# Patient Record
Sex: MALE | Race: WHITE | NOT HISPANIC OR LATINO | Employment: OTHER | URBAN - METROPOLITAN AREA
[De-identification: names, ages, dates, MRNs, and addresses within clinical notes are randomized per-mention and may not be internally consistent; named-entity substitution may affect disease eponyms.]

---

## 2017-01-27 ENCOUNTER — HOSPITAL ENCOUNTER (OUTPATIENT)
Dept: PSYCHIATRY | Facility: HOSPITAL | Age: 52
Discharge: HOME | End: 2017-01-27
Attending: PSYCHIATRY & NEUROLOGY
Payer: COMMERCIAL

## 2017-01-27 VITALS
HEART RATE: 74 BPM | BODY MASS INDEX: 19.12 KG/M2 | TEMPERATURE: 98 F | HEIGHT: 72 IN | DIASTOLIC BLOOD PRESSURE: 83 MMHG | WEIGHT: 141.19 LBS | SYSTOLIC BLOOD PRESSURE: 135 MMHG

## 2017-01-27 DIAGNOSIS — F10.20 ALCOHOL USE DISORDER, SEVERE, DEPENDENCE: Primary | ICD-10-CM

## 2017-01-27 DIAGNOSIS — F10.10 ALCOHOL ABUSE: ICD-10-CM

## 2017-01-27 LAB
AMPHET+METHAMPHET UR QL: NEGATIVE
BARBITURATES UR QL SCN>200 NG/ML: NEGATIVE
BENZODIAZ UR QL SCN>200 NG/ML: ABNORMAL
BREATH ALCOHOL: 0.06
BREATH ALCOHOL: 0.12
BZE UR QL SCN: NEGATIVE
CANNABINOIDS UR QL SCN: NEGATIVE
CREAT UR-MCNC: 313 MG/DL
ETHANOL UR-MCNC: 109 MG/DL
METHADONE UR QL SCN>300 NG/ML: NEGATIVE
OPIATES UR QL SCN: NEGATIVE
PCP UR QL SCN>25 NG/ML: NEGATIVE
TOXICOLOGY INFORMATION: ABNORMAL

## 2017-01-27 PROCEDURE — 99222 1ST HOSP IP/OBS MODERATE 55: CPT | Mod: ,,, | Performed by: PSYCHIATRY & NEUROLOGY

## 2017-01-27 PROCEDURE — 80307 DRUG TEST PRSMV CHEM ANLYZR: CPT

## 2017-01-27 RX ORDER — DIAZEPAM 5 MG/1
5 TABLET ORAL
Qty: 14 TABLET | Refills: 0 | Status: SHIPPED | OUTPATIENT
Start: 2017-01-27 | End: 2017-01-30
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
## 2017-01-30 ENCOUNTER — HOSPITAL ENCOUNTER (OUTPATIENT)
Dept: PSYCHIATRY | Facility: HOSPITAL | Age: 52
Discharge: HOME | End: 2017-01-30
Attending: PSYCHIATRY & NEUROLOGY
Payer: COMMERCIAL

## 2017-01-30 VITALS — HEART RATE: 97 BPM | DIASTOLIC BLOOD PRESSURE: 82 MMHG | RESPIRATION RATE: 18 BRPM | SYSTOLIC BLOOD PRESSURE: 147 MMHG

## 2017-01-30 DIAGNOSIS — F10.10 ALCOHOL ABUSE: ICD-10-CM

## 2017-01-30 DIAGNOSIS — F10.20 ALCOHOL USE DISORDER, SEVERE, DEPENDENCE: Primary | ICD-10-CM

## 2017-01-30 LAB
AMPHET+METHAMPHET UR QL: NEGATIVE
BARBITURATES UR QL SCN>200 NG/ML: NEGATIVE
BENZODIAZ UR QL SCN>200 NG/ML: ABNORMAL
BREATH ALCOHOL: 0
BZE UR QL SCN: NEGATIVE
CANNABINOIDS UR QL SCN: NEGATIVE
CREAT UR-MCNC: >450 MG/DL
ETHANOL UR-MCNC: <10 MG/DL
METHADONE UR QL SCN>300 NG/ML: NEGATIVE
OPIATES UR QL SCN: NEGATIVE
PCP UR QL SCN>25 NG/ML: NEGATIVE
TOXICOLOGY INFORMATION: ABNORMAL

## 2017-01-30 PROCEDURE — 80307 DRUG TEST PRSMV CHEM ANLYZR: CPT

## 2017-01-30 PROCEDURE — 90853 GROUP PSYCHOTHERAPY: CPT | Mod: 59,,, | Performed by: PSYCHOLOGIST

## 2017-01-30 PROCEDURE — 90853 GROUP PSYCHOTHERAPY: CPT | Mod: ,,, | Performed by: PSYCHOLOGIST

## 2017-01-30 PROCEDURE — 90853 GROUP PSYCHOTHERAPY: CPT

## 2017-01-30 PROCEDURE — 90853 GROUP PSYCHOTHERAPY: CPT | Performed by: SOCIAL WORKER

## 2017-01-30 PROCEDURE — 99232 SBSQ HOSP IP/OBS MODERATE 35: CPT | Mod: ,,, | Performed by: PSYCHIATRY & NEUROLOGY

## 2017-01-30 RX ORDER — DIAZEPAM 5 MG/1
TABLET ORAL
Qty: 6 TABLET | Refills: 0 | Status: SHIPPED | OUTPATIENT
Start: 2017-01-30 | End: 2017-01-30 | Stop reason: CLARIF

## 2017-01-30 RX ORDER — DIAZEPAM 5 MG/1
TABLET ORAL
Qty: 8 TABLET | Refills: 0 | Status: SHIPPED | OUTPATIENT
Start: 2017-01-30 | End: 2017-02-07

## 2017-01-30 NOTE — PLAN OF CARE
01/30/17 1000   Activity/Group Therapy Checklist   Group Addiction Education  (Peer presented life story; feedback given.)   Attendance Attended   Follows Direction Followed directions   Group Interactions/Observations Interacted appropriately   Affect/Mood Range Normal range   Affect/Mood Display Appropriate   Goal Progression Progressing

## 2017-01-30 NOTE — PROGRESS NOTES
Group Psychotherapy (PhD/LCSW)    Site: [facility]    Clinical status of patient: Intensive Outpatient Program (IOP)    Date: 1/30/2017    Group Focus: Communication Skills     Length of service: 83038 - 45-50 minutes    Number of patients in attendance: 14    Referred by: Addictive Behavior Unit Treatment Team    Target symptoms: Alcohol Abuse    Patient's response to treatment: Active Listening and Self-disclosure    Progress toward goals: Progressing adequately    Interval History: Discussed the way differences in gender, fx of origin experiences, and ethnicity can hamper the communication process when they are not taken into account. Illustrated how the use of I-messages and Reflective Listening skills can help overcome these difficulties and enhance the communication process.    Diagnosis: alcohol use disorder, severe, dependence    Plan: Continue treatment on ABU

## 2017-01-30 NOTE — PROGRESS NOTES
Group Psychotherapy (PhD/LCSW)    Site: [facility]    Clinical status of patient: Intensive Outpatient Program (IOP)    Date: 1/30/2017    Group Focus: Psychodynamic Group Psychotherapy    Length of service: 35067 - 45-50 minutes    Number of patients in attendance: 10    Referred by: Addictive Behavior Unit Treatment Team    Target symptoms: Alcohol Abuse    Patient's response to treatment: Active Listening and Self-disclosure    Progress toward goals: Progressing adequately    Interval History: Shared his story with the group.    Diagnosis: alcohol use disorder, severe, dependence    Plan: Continue treatment on ABU

## 2017-01-30 NOTE — PROGRESS NOTES
PSYCHIATRY  ABU Partial Hospitalization  PROGRESS NOTE    Patient Name: Eros Vital  2017  10:08 AM  Start Date: 2017  : 1965    Status: Intensive Outpatient Program (IOP)    SUBJECTIVE:    Mr. Vital said overall this weekend went well. He mostly stayed in the bedroom and took it easy. He took his Valium taper as prescribed by his report. He had dreams of people drinking liquor but in the dreams he wasn't drinking. Had no cravings for alcohol. Eating is not much better. Has been taking the thiamine as well. He felt physically better yesterday morning but then today started to have tremors and weakness in the legs (after a period of feeling well). He was able to walk well over the weekend as well. Took one 5 mg Valium pill this morning. No AA meetings this weekend due to withdrawal from alcohol (patient attempting to maintain safety, which is appropriate).     Medication Side Effects: denied  Cravings: denied  No other acute psychiatric issues reported at this time.    Scheduled Meds:   Current Outpatient Prescriptions on File Prior to Encounter   Medication Sig Dispense Refill    diazePAM (VALIUM) 5 MG tablet Take 1 tablet (5 mg total) by mouth taper from 4 doses each day to 1 dose and stop. one additional dose daily  after first day prn anxiety 14 tablet 0     No current facility-administered medications on file prior to encounter.          ALLERGIES:  Review of patient's allergies indicates:  No Known Allergies      Psychiatric Review Of Systems - Is patient experiencing or having changes in:  sleep: good  appetite: no changes  weight: no changes  energy/anergy: no changes  interest/pleasure/anhedonia: no changes  somatic symptoms: persistent leg weakness  libido: no changes  anxiety/panic: no changes  guilty/hopelessness: no changes  concentration: no changes  S.I.B.s/risky behavior: no changes  Irritability: no changes  Racing thoughts: no changes  Impulsive behaviors: no changes  Paranoia: no  "changes  AVH: no changes    Medical ROS:  See Dr. Waller/Rodney's Admission Note of date 1/27/2017 for ROS.     No changes.    OBJECTIVE:    Vital Signs (Most Recent)       BP 98/65     Mental Status Exam:    Appearance: older white male wearing casual clothes in NAD; poor hygiene   Behavior: calm, cooperative; obviously tremulous  Speech: normal rate, tone, and volume  Mood: "good"  Affect: full  Thought Process: linear  Thought Perceptions: denied AVH  Thought Content: denied SI, HI; no delusions apparent  Sensorium: awake, alert  Attention/Concentration: intact to conversation  Orientation: person, place, time, and situation  Memory: intact (recent, remote)  Abstraction: intact   Insight: fair  Judgment: fair    Laboratory:  No results found for this or any previous visit (from the past 48 hour(s)).      ASSESSMENT/PLAN:    Alcohol Use Disorder, severe  PTSD    Status:  Continue treatment on ABU    Plan:     1. Continue ABU protocol.  2. Breathalyzer and Urine toxicology daily.  3. VS daily x 3 days.  4. CBC, CMP  5. Patient counseled on abstinence from all substances (except benzodiazepines for now).    Patient's Intervention Response: accepting    Medications:      -continue thiamine  -will consider Naltrexone once patient has cleared alcohol withdrawal  -labs pending  -vitals, physical exam concerning for persistent alcohol withdrawal  -restarted benzodiazepine taper (had one 5 mg this AM; prescribed 5 mg QID today, tomorrow 5 mg TID, then once the following two days)    Additional Comments: Breathalyzer negative today. UDS last week + for benzodiazepines, as expected.     Patient seen and discussed with Dr. Stevens.    David Waller IV, MD  PGY-2 Psychiatry, [facility]  01/30/2017 10:21 AM        Attending Attestation:    I have independently evaluated the patient and discussed the case with the resident. I have reviewed this note and agree with its contents, as well as the assessment and plan. "     Kaity Stevens MD

## 2017-01-31 ENCOUNTER — HOSPITAL ENCOUNTER (OUTPATIENT)
Dept: PSYCHIATRY | Facility: HOSPITAL | Age: 52
Discharge: HOME | End: 2017-01-31
Attending: PSYCHIATRY & NEUROLOGY
Payer: COMMERCIAL

## 2017-01-31 VITALS — HEART RATE: 97 BPM | RESPIRATION RATE: 18 BRPM | SYSTOLIC BLOOD PRESSURE: 140 MMHG | DIASTOLIC BLOOD PRESSURE: 74 MMHG

## 2017-01-31 DIAGNOSIS — F10.20 ALCOHOL USE DISORDER, SEVERE, DEPENDENCE: Primary | ICD-10-CM

## 2017-01-31 DIAGNOSIS — F10.10 ALCOHOL ABUSE: ICD-10-CM

## 2017-01-31 LAB
AMPHET+METHAMPHET UR QL: NEGATIVE
BARBITURATES UR QL SCN>200 NG/ML: NEGATIVE
BENZODIAZ UR QL SCN>200 NG/ML: NORMAL
BREATH ALCOHOL: 0
BZE UR QL SCN: NEGATIVE
CANNABINOIDS UR QL SCN: NEGATIVE
CREAT UR-MCNC: 266 MG/DL
ETHANOL UR-MCNC: <10 MG/DL
METHADONE UR QL SCN>300 NG/ML: NEGATIVE
OPIATES UR QL SCN: NEGATIVE
PCP UR QL SCN>25 NG/ML: NEGATIVE
TOXICOLOGY INFORMATION: NORMAL

## 2017-01-31 PROCEDURE — 90853 GROUP PSYCHOTHERAPY: CPT | Mod: ,,, | Performed by: PSYCHOLOGIST

## 2017-01-31 PROCEDURE — 90853 GROUP PSYCHOTHERAPY: CPT | Mod: 59,,, | Performed by: PSYCHOLOGIST

## 2017-01-31 PROCEDURE — 90853 GROUP PSYCHOTHERAPY: CPT

## 2017-01-31 PROCEDURE — 80307 DRUG TEST PRSMV CHEM ANLYZR: CPT

## 2017-01-31 PROCEDURE — 90853 GROUP PSYCHOTHERAPY: CPT | Performed by: SOCIAL WORKER

## 2017-01-31 NOTE — PLAN OF CARE
01/31/17 1000   Activity/Group Therapy Checklist   Group Goals/Reflection  (incomplete prompts)   Attendance Attended   Follows Direction Followed directions   Group Interactions/Observations Interacted appropriately   Affect/Mood Range Normal range   Affect/Mood Display Appropriate   Goal Progression Progressing

## 2017-01-31 NOTE — PROGRESS NOTES
Group Psychotherapy (PhD/LCSW)    Site: [facility]    Clinical status of patient: Intensive Outpatient Program (IOP)    Date: 1/31/2017    Group Focus: Disease Model of Addiction    Length of service: 73773 - 45-50 minutes    Number of patients in attendance: 9    Referred by: Addictive Behavior Unit Treatment Team    Target symptoms: Alcohol Abuse    Patient's response to treatment: Active Listening and Self-disclosure    Progress toward goals: Progressing adequately    Interval History: Discussed the basic neuropsychological concepts of the Disease Model of Addiction and the way they relate to the phenomena of addiction (powerlessnes; euphoric recall; cravings; relapse; etc). Noted the way the disease model comports with the practice of 12-step recovery. Explained the importance of understanding the disease model for sustaining long-term sobriety.     Diagnosis: alcohol use disorder, severe, dependence    Plan: Continue treatment on ABU

## 2017-01-31 NOTE — PLAN OF CARE
01/30/17 1400   Activity/Group Therapy Checklist   Group Goals/Reflection  (incomplete prompts)   Attendance Attended   Follows Direction Followed directions   Group Interactions/Observations Interacted appropriately  (in active withdrawal)   Affect/Mood Range Normal range   Affect/Mood Display Appropriate   Goal Progression Progressing

## 2017-01-31 NOTE — PLAN OF CARE
01/31/17 1400   Activity/Group Therapy Checklist   Group Relapse Prevention  (Negative spiritual factors: Fear, self-pity, resentment & dishonesty)   Attendance Attended   Follows Direction Followed directions   Group Interactions/Observations Interacted appropriately   Affect/Mood Range Normal range   Affect/Mood Display Appropriate   Goal Progression Progressing

## 2017-01-31 NOTE — PROGRESS NOTES
Group Psychotherapy (PhD/LCSW)    Site: [facility]    Clinical status of patient: Intensive Outpatient Program (IOP)    Date: 1/31/2017    Group Focus: Psychodynamic Group Psychotherapy    Length of service: 07108 - 45-50 minutes    Number of patients in attendance: 10    Referred by: Addictive Behavior Unit Treatment Team    Target symptoms: Alcohol Abuse    Patient's response to treatment: Active Listening and Self-disclosure    Progress toward goals: Progressing adequately    Interval History: Shared his story with new group member.    Diagnosis: alcohol use disorder, severe, dependence    Plan: Continue treatment on ABU

## 2017-01-31 NOTE — PROGRESS NOTES
PSYCHIATRY  ABU Partial Hospitalization  PROGRESS NOTE    Patient Name: Eros Vital  2017  10:08 AM  Start Date: 2017  : 1965    Status: Intensive Outpatient Program (IOP)    SUBJECTIVE:    Mr. Vital said he is doing well this morning. Has enough Valium for his full taper. He said his wife is being productive at home and has been supportive of him. He is mostly laying around and relaxing. He is trying to improve his nutrition, eating mostly takeout with lots of calories and protein. His tremors are much improved. He is able to walk with a cane today. He said he still feels ill, but much improved.     Medication Side Effects: denied  Cravings: denied  No other acute psychiatric issues reported at this time.    Scheduled Meds:   Current Outpatient Prescriptions on File Prior to Encounter   Medication Sig Dispense Refill    diazePAM (VALIUM) 5 MG tablet Take four pills on day one. Take three pills on day two. On the last two days, take one pill daily. 8 tablet 0     No current facility-administered medications on file prior to encounter.          ALLERGIES:  Review of patient's allergies indicates:  No Known Allergies      Psychiatric Review Of Systems - Is patient experiencing or having changes in:  sleep: no changes  appetite: no changes  weight: no changes  energy/anergy: no changes  interest/pleasure/anhedonia: no changes  somatic symptoms: persistent leg weakness  libido: no changes  anxiety/panic: no changes  guilty/hopelessness: no changes  concentration: no changes  S.I.B.s/risky behavior: no changes  Irritability: no changes  Racing thoughts: no changes  Impulsive behaviors: no changes  Paranoia: no changes  AVH: no changes    Medical ROS:  See Dr. Waller/Rodney's Admission Note of date 2017 for ROS.     OBJECTIVE:    Vital Signs (Most Recent)    (not documented in Epic)      /85    Mental Status Exam:    Appearance: older white male wearing casual clothes in NAD; hygiene  "improved, with improving tremors  Behavior: calm, cooperative  Speech: normal rate, tone, and volume  Mood: "good"  Affect: full  Thought Process: linear  Thought Perceptions: denied AVH  Thought Content: denied SI, HI; no delusions apparent  Sensorium: awake, alert  Attention/Concentration: intact to conversation  Orientation: person, place, time, and situation  Memory: intact (recent, remote)  Abstraction: intact   Insight: fair  Judgment: fair    Laboratory:  Recent Results (from the past 48 hour(s))   Toxicology screen, urine    Collection Time: 01/30/17 12:43 PM   Result Value Ref Range    Alcohol, Urine <10 <10 mg/dL    Benzodiazepines Presumptive Positive     Methadone metabolites Negative     Cocaine (Metab.) Negative     Opiate Scrn, Ur Negative     Barbiturate Screen, Ur Negative     Amphetamine Screen, Ur Negative     THC Negative     Phencyclidine Negative     Creatinine, Random Ur >450.0 (H) 23.0 - 375.0 mg/dL    Toxicology Information SEE COMMENT    POCT BREATH ALCOHOL TEST    Collection Time: 01/30/17 12:44 PM   Result Value Ref Range    Breath Alcohol 0.000          ASSESSMENT/PLAN:    Alcohol Use Disorder, severe  PTSD    Status:  Continue treatment on ABU    Plan:     1. Continue ABU protocol.  2. Breathalyzer and Urine toxicology daily.  3. VS daily x 3 days.  4. CBC, CMP, B-12, TSH, Folate (will get today)  5. Patient counseled on abstinence from all substances (except benzodiazepines for now).    Patient's Intervention Response: accepting    Medications:      -continue thiamine   -will consider Naltrexone once patient has cleared alcohol withdrawal  -labs pending  -revised Valium taper: 10 mg tablets, QID yesterday, QID today, TID tomorrow, TID the next day, then BID the next day, then BID again    Additional Comments: none    Will discuss case with Dr. Stevens.    David Waller IV, MD  PGY-2 Psychiatry, \[facility]\""/[facility]  01/31/2017 10:38 AM      "

## 2017-02-01 ENCOUNTER — HOSPITAL ENCOUNTER (OUTPATIENT)
Dept: PSYCHIATRY | Facility: HOSPITAL | Age: 52
Discharge: HOME | End: 2017-02-01
Attending: PSYCHIATRY & NEUROLOGY
Payer: COMMERCIAL

## 2017-02-01 VITALS — DIASTOLIC BLOOD PRESSURE: 85 MMHG | RESPIRATION RATE: 18 BRPM | HEART RATE: 75 BPM | SYSTOLIC BLOOD PRESSURE: 139 MMHG

## 2017-02-01 DIAGNOSIS — F10.10 ALCOHOL ABUSE: ICD-10-CM

## 2017-02-01 DIAGNOSIS — F10.20 ALCOHOL USE DISORDER, SEVERE, DEPENDENCE: Primary | ICD-10-CM

## 2017-02-01 PROCEDURE — 80307 DRUG TEST PRSMV CHEM ANLYZR: CPT

## 2017-02-01 PROCEDURE — 90853 GROUP PSYCHOTHERAPY: CPT

## 2017-02-01 PROCEDURE — 90853 GROUP PSYCHOTHERAPY: CPT | Mod: 59,,, | Performed by: PSYCHOLOGIST

## 2017-02-01 PROCEDURE — 90853 GROUP PSYCHOTHERAPY: CPT | Performed by: SOCIAL WORKER

## 2017-02-01 RX ORDER — DIAZEPAM 5 MG/1
TABLET ORAL
Qty: 7 TABLET | Refills: 0 | Status: SHIPPED | OUTPATIENT
Start: 2017-02-01 | End: 2017-02-03

## 2017-02-01 NOTE — PATIENT CARE CONFERENCE
Alcohol Use Disorder, Severe  PTSD      1. Pt is attending all groups    2. Pt is attending all meetings  3. Pt 's family is supportive of treatment    4. Pt has completed spiritual assessment    5. Pt will present life story    6. Pt will present Step One assignment    7. Pt is exploring issues related to relapse  prevention; spirituality; stress management; improved communication skills; assertiveness training; poor self-esteem; disease concepts; cross addictions; and, work related issues    8. D/C date: TBD          Staff discussed pt's admittance to program for alcohol use disorder and PTSD. Staffing discussed pt currently detoxxing with a valium taper and unsteady gait. Staffing also discussed pt's poor hygiene and supportive wife.    Problem: Alcohol Use Disorder, Severe  Goal: Address in 12 step meetings and group and individual sessions    Objective Measure: participation in groups, self report, length of sobriety, and relapse prevention plan  Time: Prior to discharge    Progress: Pt is attending groups and sessions       Problem: PTSD  Goal: Address in 12 step meetings and group and individual sessions    Objective Measure: participation in groups, self report, length of sobriety, and relapse prevention plan  Time: Prior to discharge    Progress: Pt is attending groups and sessions         Staff members present:    MD Dr. Rodney Marroquin MD Dr. Simpson, MD Dr. Correa, Ph.D.  TREMAINE Brink JORGE Ackerman RN

## 2017-02-01 NOTE — PROGRESS NOTES
Group Psychotherapy (PhD/LCSW)    Site: [facility]    Clinical status of patient: Intensive Outpatient Program (IOP)    Date: 2/1/2017    Group Focus: Stress Management    Length of service: 60930 - 45-50 minutes    Number of patients in attendance: 18    Referred by: Addictive Behavior Unit Treatment Team    Target symptoms: Alcohol Abuse    Patient's response to treatment: Active Listening and Self-disclosure    Progress toward goals: Progressing adequately    Interval History: Discussed assertive, non-assertive, and aggressive behavior.    Diagnosis: alcohol use disorder, severe, dependence    Plan: Continue treatment on ABU

## 2017-02-01 NOTE — PLAN OF CARE
02/01/17 1400   Activity/Group Therapy Checklist   Group Relapse Prevention  (Peer presented 1st Step)   Attendance Attended   Follows Direction Followed directions   Group Interactions/Observations Interacted appropriately   Affect/Mood Range Normal range   Affect/Mood Display Appropriate   Goal Progression Progressing

## 2017-02-01 NOTE — PROGRESS NOTES
"PSYCHIATRY  ABU Partial Hospitalization  PROGRESS NOTE    Patient Name: Eros Vital  2017  10:08 AM  Start Date: 2017  : 1965    Status: Intensive Outpatient Program (IOP)  CC:  Alcohol use disorder    SUBJECTIVE:    Day 4 since intake.  Mr. Vital reports gait instability this morning.  Was running late this morning and did not take his Valium this morning.  Has two tablets left.  Unsteadiness with walking and hand tremors from withdrawals.  Reports "fine" mood up until he discovered his wife lost her wallet.  Now, he feels a little worried.  Eating and sleeping fine.  Wife is very supportive.         Medication Side Effects: none  Cravings: denied  No other acute psychiatric issues reported at this time.    Scheduled Meds:   Current Outpatient Prescriptions on File Prior to Encounter   Medication Sig Dispense Refill    diazePAM (VALIUM) 5 MG tablet Take four pills on day one. Take three pills on day two. On the last two days, take one pill daily. 8 tablet 0     No current facility-administered medications on file prior to encounter.      ALLERGIES:  Review of patient's allergies indicates:  No Known Allergies    Psychiatric Review Of Systems - Is patient experiencing or having changes in:  sleep: no changes  appetite: no changes  weight: no changes  energy/anergy: no changes  interest/pleasure/anhedonia: no changes  somatic symptoms: persistent leg weakness  libido: no changes  anxiety/panic: no changes  guilty/hopelessness: no changes  concentration: no changes  S.I.B.s/risky behavior: no changes  Irritability: no changes  Racing thoughts: no changes  Impulsive behaviors: no changes  Paranoia: no changes  AVH: no changes    Medical ROS:  See Dr. Waller/Rodney's Admission Note of date 2017 for ROS.     OBJECTIVE:    Vital Signs (Most Recent)  HR 75  /85    Mental Status Exam:  Appearance: older appearing than stated age, white male wearing casual clothes in NAD; hygiene improved, with " "improving tremors  Behavior: calm, cooperative  Speech: normal rate, tone, and volume  Mood: "good"  Affect: full  Thought Process: linear  Thought Perceptions: denied AVH  Thought Content: denied SI, HI; no delusions apparent  Sensorium: awake, alert  Attention/Concentration: intact to conversation  Orientation: person, place, time, and situation  Memory: intact (recent, remote)  Abstraction: intact   Insight: fair  Judgment: fair    Laboratory:  Recent Results (from the past 48 hour(s))   Toxicology screen, urine    Collection Time: 01/30/17 12:43 PM   Result Value Ref Range    Alcohol, Urine <10 <10 mg/dL    Benzodiazepines Presumptive Positive     Methadone metabolites Negative     Cocaine (Metab.) Negative     Opiate Scrn, Ur Negative     Barbiturate Screen, Ur Negative     Amphetamine Screen, Ur Negative     THC Negative     Phencyclidine Negative     Creatinine, Random Ur >450.0 (H) 23.0 - 375.0 mg/dL    Toxicology Information SEE COMMENT    POCT BREATH ALCOHOL TEST    Collection Time: 01/30/17 12:44 PM   Result Value Ref Range    Breath Alcohol 0.000    Toxicology screen, urine    Collection Time: 01/31/17  1:15 PM   Result Value Ref Range    Alcohol, Urine <10 <10 mg/dL    Benzodiazepines Presumptive Positive     Methadone metabolites Negative     Cocaine (Metab.) Negative     Opiate Scrn, Ur Negative     Barbiturate Screen, Ur Negative     Amphetamine Screen, Ur Negative     THC Negative     Phencyclidine Negative     Creatinine, Random Ur 266.0 23.0 - 375.0 mg/dL    Toxicology Information SEE COMMENT    POCT BREATH ALCOHOL TEST    Collection Time: 01/31/17  1:16 PM   Result Value Ref Range    Breath Alcohol 0.000          ASSESSMENT/PLAN:  Alcohol Use Disorder, severe  PTSD    Status:  Continue treatment on ABU    Plan:     1. Continue ABU protocol.  2. Breathalyzer and Urine toxicology daily.  3. VS daily x 3 days.  4. CBC, CMP, B-12, TSH, Folate (will get today)  5. Patient counseled on abstinence from " all substances (except benzodiazepines for now).    Patient's Intervention Response: accepting    Medications:      -continue thiamine   -will consider Naltrexone once patient has cleared alcohol withdrawal  -labs pending  -revised Valium taper:  10 mg TID 1/31, 10 mg BID 2/1, 5 mg BID 2/2, 5 mg once 2/3.  Gave prescription for 5 mg tablets, disp 7# 2/1.      Additional Comments: none        Jorge Luis Orta MD  [facility]  PGY-2  Pager:  [phone]

## 2017-02-01 NOTE — TREATMENT PLAN
[facility]  ADDICTIVE BEHAVIOR UNIT  INTERDISCIPLINARY TREATMENT PLAN  INTENSIVE OUTPATIENT PROGRAM    INTERDISCIPLINARY  TREATMENT TEAM:    Beck Mora M.D., Psychiatrist     Kaity Stevens M.D., Psychiatrist     Ceci Winters, Ph.D., Clinical Psychologist    Aleta Boyd, Ph.D., Clinical Psychologist    Christopher Echols,  Ph.D., Clinical Psychologist    Kimberli Schmidt R.N., Registered Nurse    Richard Naidu, [facility],     Phillip Colin, [facility],     Jackelin Crow, [facility],     Resident:  David Waller M.D.    Resident:  Trish Dhillon M.D.      Signatures scanned into record separately.      ESTIMATED LOS:  4-6 weeks        The patient has reviewed the treatment plan with staff and has signed the Patient Responsibilities form.  Patient signature scanned into record separately        Dr. Beck Mora certifies that the patient would require inpatient psychiatric care if the Partial Hospitalization services were not provided, and services will be furnished under the care of a physician, and under a written Plan of Treatment.    Beck Mora M.D., Psychiatrist - Signature scanned into record separately.    TREATMENT PLAN    DIAGNOSIS:  Alcohol Use Disorder, severe  PTSD      Patient/Family Education Needs/Barriers to Learning (i.e., Language, Reading, Comprehension): none      Support/Advocacy Services/Needs (i.e., Financial, Transportation, Medications): none      Community Resources (i.e., Alcoholics Anonymous, Al Anon, Cocaine Anonymous, Narcotics Anonymous): none          Strengths:    1. Creativity  2. Intelligence  3. Fairness  4. Sense of humor        Limitations:  1. Difficulty with stress  2. Stubbornness  3. Boredom  4. Easy access to alcohol in the neighborhood          Goals and Objectives:  1. Goal:  Abstain from alcohol and illicit drugs   Objective measure: Negative breathalyzer, negative urine screens   Time frame to reach goal: By  discharge    2  Goal: Attend daily 12-step meetings   Objective measure: Signed attendance sheet daily   Time frame to reach goal: Each day    3. Goal: Participate in group sessions    Objective measure: Progress notes indicating active listening, self-disclosure,   feedback   Time frame to reach goal: Each day    4. Goal: Obtain a 12-step sponsor   Objective measure: self-report   Time frame to reach goal: By discharge    5. Goal: Complete Life Story   Objective measure: Share story with group   Time frame to reach goal: Within first two weeks of treatment    6. Goal: Complete First Step   Objective measure: Share 1st step with group   Time frame to reach goal:  By discharge    7. Goal: Complete Relapse Prevention Plan   Objective measure: Share plan with group   Time frame to reach goal: By discharge    8. Goal: Complete detoxification   Objective measure: Physician progress note indicating detoxification is complete   Time frame to reach goal: Two weeks    9.  Goal: Family involvement/participation   Objective measure: Family session documented in progress notes   Time frame to reach goal: By discharge    10.  Goal: Reduce anxiety   Objective measure: Physician progress note indicating anxiety is improved   Time frame to reach goal: By discharge    11.  Goal: Address Health Problems--withdrawal symptoms vs. Possible neurological damage   Objective measure: Physicians note regarding assessment and management of health problems   Time frame to reach goal: Within first week of treatment                    Group Interventions:  Psychodynamic Group Psychotherapy  1 hour, five times per week  Goals: 1. Utilize group empathy and support for problem solving; 2. Apply stress management, communication, and assertive skills to personal issues; 3. Discuss negative consequences of addictive behavior; 4. Discuss ways to change lifestyle to support sobriety; 5. Discuss addiction history    Addiction Education Group  1 hour, 2  times per week  Goals:  1. Verbalize increased knowledge of the process of recovery; 2. Understand basic concepts of addiction (denial, powerlessness, unmanageabiltiy, etc.); 3. Develop a consistent, positive image of self    Steps to Recovery Group  1 hour, 1 time per week  Goals:  1. Learn 12 steps; 2. Identify ways to incorporate 12 step principles into daily life; 3. Complete first step; 4. Verbalize knowledge and understanding of the concept of a higher power    Living Sober Group  1 hour, 2 times per week  Goals:  1.  Reflect upon events of day/weekend, focusing on positive change; 2.  Discuss dynamics of 12 step meetings attended; 3. Discuss topics from book Living Sober     Stress Management Skills Group  1 hour, 3 times per week  Goals: 1. Identify types and levels of stress; 2. Identify and change maladaptive beliefs and behaviors; 3. Identify and practice techniques of stress management    Disease Concept Group  1 hour, 1 time per week  Goals: 1. Verbalize an understanding of the disease concept of addiction; 2. Increase familys understanding of the disease concept of addiction    Communication Skills Group  1 hour, 2 times per week  Goals: 1. Learn rules of effective communication; 2. Improve listening skills; 3. Practice clear communication    Promoting Healthy Lifestyles Group  1 hour, 1 time per week  Goals:  1. Understand the biopsychosocial model of health; 2. Develop insight into how substance abuse/dependency can impact dimensions of health; 3. Develop appropriate health promotion strategies    Relationship Dynamics Group  1 hour, 1 time per week  Goals:  1. Learn about factors that shape relationships; 2. Understand the central role of relationships in personal well-being; 3. Learn how to improve all relationships    Medical Complications Group  1 hour, 1 time per week  Goals:  1.  Increase knowledge of how addiction negatively affects the body; 2. Increase awareness of how abstinence can  positively impact health

## 2017-02-01 NOTE — PLAN OF CARE
02/01/17 1000   Activity/Group Therapy Checklist   Group Addiction Education  (Peer completed life story; feedback given. Discussion of positive factors: Trust, Gratitude, Acceptance & Honesty)   Attendance Attended   Follows Direction Followed directions   Group Interactions/Observations Interacted appropriately   Affect/Mood Range Normal range   Affect/Mood Display Appropriate   Goal Progression Progressing

## 2017-02-01 NOTE — PROGRESS NOTES
Group Psychotherapy (PhD/LCSW)    Site: [facility]    Clinical status of patient: Intensive Outpatient Program (IOP)    Date: 2/1/2017    Group Focus: Psychodynamic Group Psychotherapy    Length of service: 44521 - 45-50 minutes    Number of patients in attendance: 12    Referred by: Addictive Behavior Unit Treatment Team    Target symptoms: Alcohol Abuse    Patient's response to treatment: Active Listening and Self-disclosure    Progress toward goals: Progressing adequately    Interval History: Shared his story with new group member.    Diagnosis: alcohol use disorder, severe, dependence    Plan: Continue treatment on ABU

## 2017-02-02 ENCOUNTER — HOSPITAL ENCOUNTER (OUTPATIENT)
Dept: PSYCHIATRY | Facility: HOSPITAL | Age: 52
Discharge: HOME | End: 2017-02-02
Attending: PSYCHIATRY & NEUROLOGY
Payer: COMMERCIAL

## 2017-02-02 VITALS — RESPIRATION RATE: 18 BRPM | SYSTOLIC BLOOD PRESSURE: 138 MMHG | HEART RATE: 93 BPM | DIASTOLIC BLOOD PRESSURE: 83 MMHG

## 2017-02-02 DIAGNOSIS — F10.10 ALCOHOL ABUSE: ICD-10-CM

## 2017-02-02 DIAGNOSIS — F10.20 ALCOHOL USE DISORDER, SEVERE, DEPENDENCE: Primary | ICD-10-CM

## 2017-02-02 LAB
AMPHET+METHAMPHET UR QL: NEGATIVE
BARBITURATES UR QL SCN>200 NG/ML: NEGATIVE
BENZODIAZ UR QL SCN>200 NG/ML: NORMAL
BREATH ALCOHOL: 0
BZE UR QL SCN: NEGATIVE
CANNABINOIDS UR QL SCN: NEGATIVE
CREAT UR-MCNC: 52 MG/DL
ETHANOL UR-MCNC: <10 MG/DL
METHADONE UR QL SCN>300 NG/ML: NEGATIVE
OPIATES UR QL SCN: NEGATIVE
PCP UR QL SCN>25 NG/ML: NEGATIVE
TOXICOLOGY INFORMATION: NORMAL

## 2017-02-02 PROCEDURE — 80307 DRUG TEST PRSMV CHEM ANLYZR: CPT

## 2017-02-02 PROCEDURE — 99233 SBSQ HOSP IP/OBS HIGH 50: CPT | Mod: ,,, | Performed by: PSYCHIATRY & NEUROLOGY

## 2017-02-02 PROCEDURE — 90853 GROUP PSYCHOTHERAPY: CPT | Mod: 59,,, | Performed by: PSYCHOLOGIST

## 2017-02-02 PROCEDURE — 90853 GROUP PSYCHOTHERAPY: CPT

## 2017-02-02 PROCEDURE — 90853 GROUP PSYCHOTHERAPY: CPT | Performed by: SOCIAL WORKER

## 2017-02-02 NOTE — PROGRESS NOTES
"PSYCHIATRY  ABU Partial Hospitalization  PROGRESS NOTE    Patient Name: Eros Vital  2017  10:08 AM  Start Date: 2017  : 1965    Status: Intensive Outpatient Program (IOP)  CC:  Alcohol use disorder    SUBJECTIVE:    Day 4 since intake.  Mr. Vital reports gait instability this morning.  Was running late this morning and did not take his Valium this morning.  Has two tablets left.  Unsteadiness with walking and hand tremors from withdrawals.  Reports "fine" mood up until he discovered his wife lost her wallet.  Now, he feels a little worried.  Eating and sleeping fine.  Wife is very supportive.         Mr. Vital is doing "ok" this morning. He continues to experience unsteadiness, and hand tremors. Reports sleep and appetite have been unchanged. His wife is very supportive and is not someone who drinks at all, so that is not an activity they do together. Currently he is not attending AA meetings, and has not felt he has needed to. Feels in general the program is going well.     Medication Side Effects: none  Cravings: denied  No other acute psychiatric issues reported at this time.    Scheduled Meds:   Current Outpatient Prescriptions on File Prior to Encounter   Medication Sig Dispense Refill    diazePAM (VALIUM) 5 MG tablet Take four pills on day one. Take three pills on day two. On the last two days, take one pill daily. 8 tablet 0    diazePAM (VALIUM) 5 MG tablet Take 2 tabs twice today.  Take 1 tab twice Thursday.  Take 1 tab once Friday. 7 tablet 0     No current facility-administered medications on file prior to encounter.      ALLERGIES:  Review of patient's allergies indicates:  No Known Allergies    Psychiatric Review Of Systems - Is patient experiencing or having changes in:  sleep: no changes  appetite: no changes  weight: no changes  energy/anergy: no changes  interest/pleasure/anhedonia: no changes  somatic symptoms: persistent leg weakness  libido: no changes  anxiety/panic: no " "changes  guilty/hopelessness: no changes  concentration: no changes  S.I.B.s/risky behavior: no changes  Irritability: no changes  Racing thoughts: no changes  Impulsive behaviors: no changes  Paranoia: no changes  AVH: no changes    Medical ROS:  See Dr. Waller/Rodney's Admission Note of date 1/27/2017 for ROS.     OBJECTIVE:    Vital Signs (Most Recent)  /83  HR: 93    Mental Status Exam:  Appearance: older appearing than stated age, white male wearing casual clothes in NAD; hygiene improved, with improving tremors  Behavior: calm, cooperative  Speech: normal rate, tone, and volume  Mood: "good"  Affect: full  Thought Process: linear  Thought Perceptions: denied AVH  Thought Content: denied SI, HI; no delusions apparent  Sensorium: awake, alert  Attention/Concentration: intact to conversation  Orientation: person, place, time, and situation  Memory: intact (recent, remote)  Abstraction: intact   Insight: fair  Judgment: fair    Laboratory:  Recent Results (from the past 48 hour(s))   Toxicology screen, urine    Collection Time: 01/31/17  1:15 PM   Result Value Ref Range    Alcohol, Urine <10 <10 mg/dL    Benzodiazepines Presumptive Positive     Methadone metabolites Negative     Cocaine (Metab.) Negative     Opiate Scrn, Ur Negative     Barbiturate Screen, Ur Negative     Amphetamine Screen, Ur Negative     THC Negative     Phencyclidine Negative     Creatinine, Random Ur 266.0 23.0 - 375.0 mg/dL    Toxicology Information SEE COMMENT    POCT BREATH ALCOHOL TEST    Collection Time: 01/31/17  1:16 PM   Result Value Ref Range    Breath Alcohol 0.000    Toxicology screen, urine    Collection Time: 02/01/17  2:35 PM   Result Value Ref Range    Alcohol, Urine <10 <10 mg/dL    Benzodiazepines Presumptive Positive     Methadone metabolites Negative     Cocaine (Metab.) Negative     Opiate Scrn, Ur Negative     Barbiturate Screen, Ur Negative     Amphetamine Screen, Ur Negative     THC Negative     Phencyclidine " Negative     Creatinine, Random Ur 266.0 23.0 - 375.0 mg/dL    Toxicology Information SEE COMMENT    POCT BREATH ALCOHOL TEST    Collection Time: 02/01/17  2:36 PM   Result Value Ref Range    Breath Alcohol 0.000          ASSESSMENT/PLAN:  Alcohol Use Disorder, severe  Alcohol Withdrawal  PTSD    Status:  Continue treatment on ABU    Plan:   1. Continue ABU protocol.  2. Breathalyzer and Urine toxicology daily.  3. VS daily x 3 days.  4. CBC, CMP, B-12, TSH, Folate (will get today)  5. Patient counseled on abstinence from all substances (except benzodiazepines for now).    Patient's Intervention Response: accepting    Medications:    -continue thiamine   -will consider Naltrexone once patient has cleared alcohol withdrawal  -labs pending, ordered as future  -revised Valium taper:  10 mg TID 1/31, 10 mg BID 2/1, 5 mg BID 2/2, 5 mg once 2/3.  Gave prescription for 5 mg tablets, disp 7# 2/1.    Recommend today valium 5mg tid, and then decrease to 5mg bid tomorrow.    Additional Comments: none    Francia Dhillon MD, MPH  [facility]   [facility]      Attending Attestation:    I have independently evaluated the patient and discussed the case with the resident. I have reviewed this note and agree with its contents, as well as the assessment and plan.     Kaity Stevens MD

## 2017-02-02 NOTE — PROGRESS NOTES
Group Psychotherapy (PhD/LCSW)    Site: [facility]    Clinical status of patient: Intensive Outpatient Program (IOP)    Date: 2/2/2017    Group Focus: Stress Management    Length of service: 09034 - 45-50 minutes    Number of patients in attendance: 15    Referred by: Addictive Behavior Unit Treatment Team    Target symptoms: Alcohol Abuse    Patient's response to treatment: Active Listening and Self-disclosure    Progress toward goals: Progressing adequately    Interval History: Discussed non-verbal aspects of assertive behavior and role played being assertive.    Diagnosis: alcohol use disorder, severe, dependence    Plan: Continue treatment on ABU

## 2017-02-02 NOTE — PLAN OF CARE
02/02/17 1000   Activity/Group Therapy Checklist   Group Addiction Education  (Common Defense Mechanisms in Addiction)   Attendance Attended   Follows Direction Followed directions   Group Interactions/Observations Interacted appropriately   Affect/Mood Range Normal range   Affect/Mood Display Appropriate   Goal Progression Progressing

## 2017-02-02 NOTE — PLAN OF CARE
02/02/17 1400   Activity/Group Therapy Checklist   Group Relapse Prevention  (Dry Drunk Syndrome and Tips for Counteracting it in Recovery)   Attendance Attended   Follows Direction Followed directions   Group Interactions/Observations Interacted appropriately   Affect/Mood Range Normal range   Affect/Mood Display Appropriate   Goal Progression Progressing

## 2017-02-02 NOTE — PROGRESS NOTES
Group Psychotherapy (PhD/LCSW)    Site: [facility]    Clinical status of patient: Intensive Outpatient Program (IOP)    Date: 2/2/2017    Group Focus: Psychodynamic Group Psychotherapy    Length of service: 58909 - 45-50 minutes    Number of patients in attendance: 11    Referred by: Addictive Behavior Unit Treatment Team    Target symptoms: Alcohol Abuse    Patient's response to treatment: Active Listening and Self-disclosure    Progress toward goals: Progressing adequately    Interval History: Shared his story with new group member and discussed his bottom.    Diagnosis: alcohol use disorder, severe, dependence    Plan: Continue treatment on ABU

## 2017-02-03 ENCOUNTER — HOSPITAL ENCOUNTER (OUTPATIENT)
Dept: PSYCHIATRY | Facility: HOSPITAL | Age: 52
Discharge: HOME | End: 2017-02-03
Attending: PSYCHIATRY & NEUROLOGY
Payer: COMMERCIAL

## 2017-02-03 VITALS — RESPIRATION RATE: 16 BRPM | SYSTOLIC BLOOD PRESSURE: 148 MMHG | DIASTOLIC BLOOD PRESSURE: 83 MMHG | HEART RATE: 84 BPM

## 2017-02-03 DIAGNOSIS — F10.20 ALCOHOL USE DISORDER, SEVERE, DEPENDENCE: Primary | ICD-10-CM

## 2017-02-03 DIAGNOSIS — F10.10 ALCOHOL ABUSE: ICD-10-CM

## 2017-02-03 LAB
AMPHET+METHAMPHET UR QL: NEGATIVE
BARBITURATES UR QL SCN>200 NG/ML: NEGATIVE
BENZODIAZ UR QL SCN>200 NG/ML: NORMAL
BREATH ALCOHOL: 0
BZE UR QL SCN: NEGATIVE
CANNABINOIDS UR QL SCN: NEGATIVE
CREAT UR-MCNC: 78 MG/DL
ETHANOL UR-MCNC: <10 MG/DL
METHADONE UR QL SCN>300 NG/ML: NEGATIVE
OPIATES UR QL SCN: NEGATIVE
PCP UR QL SCN>25 NG/ML: NEGATIVE
TOXICOLOGY INFORMATION: NORMAL

## 2017-02-03 PROCEDURE — 99233 SBSQ HOSP IP/OBS HIGH 50: CPT | Mod: ,,, | Performed by: PSYCHIATRY & NEUROLOGY

## 2017-02-03 PROCEDURE — 90853 GROUP PSYCHOTHERAPY: CPT | Mod: 59,,, | Performed by: PSYCHOLOGIST

## 2017-02-03 PROCEDURE — 80307 DRUG TEST PRSMV CHEM ANLYZR: CPT

## 2017-02-03 PROCEDURE — 90853 GROUP PSYCHOTHERAPY: CPT

## 2017-02-03 PROCEDURE — 90853 GROUP PSYCHOTHERAPY: CPT | Performed by: SOCIAL WORKER

## 2017-02-03 PROCEDURE — 90853 GROUP PSYCHOTHERAPY: CPT | Mod: ,,, | Performed by: PSYCHOLOGIST

## 2017-02-03 NOTE — PROGRESS NOTES
Group Psychotherapy (PhD/LCSW)    Site: [facility]    Clinical status of patient: Intensive Outpatient Program (IOP)    Date: 2/3/2017    Group Focus: Stress Management    Length of service: 50311 - 45-50 minutes    Number of patients in attendance: 16    Referred by: Addictive Behavior Unit Treatment Team    Target symptoms: Alcohol Abuse    Patient's response to treatment: Active Listening    Progress toward goals: Progressing adequately    Interval History: Group learned mindfulness techniques (breathing, eating) to improve present-moment awareness, impulsive behavior tendencies, and tolerance of various emotional states.    Diagnosis: Alcohol Use Disorder    Plan: Continue treatment on ABU

## 2017-02-03 NOTE — PROGRESS NOTES
Group Psychotherapy (PhD/LCSW)    Site: [facility]    Clinical status of patient: Intensive Outpatient Program (IOP)    Date: 2/3/2017    Group Focus: Psychodynamic Group Psychotherapy    Length of service: 09190 - 45-50 minutes    Number of patients in attendance: 11    Referred by: Addictive Behavior Unit Treatment Team    Target symptoms: Alcohol Abuse    Patient's response to treatment: Active Listening and Self-disclosure    Progress toward goals: Progressing adequately    Interval History: Discussed taking responsibility for one's actions during active addiction and assuming a stance of humility.     Diagnosis: alcohol use disorder, severe, dependence    Plan: Continue treatment on ABU

## 2017-02-03 NOTE — PLAN OF CARE
02/03/17 1400   Activity/Group Therapy Checklist   Group Relapse Prevention   Attendance Attended   Follows Direction Followed directions   Group Interactions/Observations Interacted appropriately   Affect/Mood Range Normal range   Affect/Mood Display Appropriate   Goal Progression Progressing

## 2017-02-03 NOTE — PROGRESS NOTES
PSYCHIATRY  ABU Partial Hospitalization  PROGRESS NOTE    Patient Name: Eros Vital  2/3/2017  10:08 AM  Start Date: 2017  : 1965    Status: Intensive Outpatient Program (IOP)  CC:  Alcohol use disorder    SUBJECTIVE:    Mr. Vital is doing all right this morning. His only concern is that he has ongoing tremors. He feels they are mildly improved this am, and he asks how long we think it will take for his tremors to resolve. He denies any other complaints. He is sleeping well, and his appetite is unchanged.     Medication Side Effects: none  Cravings: denied  No other acute psychiatric issues reported at this time.    Scheduled Meds:   Current Outpatient Prescriptions on File Prior to Encounter   Medication Sig Dispense Refill    diazePAM (VALIUM) 5 MG tablet Take four pills on day one. Take three pills on day two. On the last two days, take one pill daily. 8 tablet 0    diazePAM (VALIUM) 5 MG tablet Take 2 tabs twice today.  Take 1 tab twice Thursday.  Take 1 tab once Friday. 7 tablet 0     No current facility-administered medications on file prior to encounter.      ALLERGIES:  Review of patient's allergies indicates:  No Known Allergies    Psychiatric Review Of Systems - Is patient experiencing or having changes in:  sleep: no changes  appetite: no changes  weight: no changes  energy/anergy: no changes  interest/pleasure/anhedonia: no changes  somatic symptoms: persistent leg weakness  libido: no changes  anxiety/panic: no changes  guilty/hopelessness: no changes  concentration: no changes  S.I.B.s/risky behavior: no changes  Irritability: no changes  Racing thoughts: no changes  Impulsive behaviors: no changes  Paranoia: no changes  AVH: no changes    Medical ROS:  See Dr. Waller/Rodney's Admission Note of date 2017 for ROS.     OBJECTIVE:    Vital Signs (Most Recent)  BP: 138/80  HR: 101    Mental Status Exam:  Appearance: older appearing than stated age, white male wearing casual clothes in  "NAD; hygiene improved, with improving tremors  Behavior: calm, cooperative  Speech: normal rate, tone, and volume  Mood: "good"  Affect: full  Thought Process: linear  Thought Perceptions: denied AVH  Thought Content: denied SI, HI; no delusions apparent  Sensorium: awake, alert  Attention/Concentration: intact to conversation  Orientation: person, place, time, and situation  Memory: intact (recent, remote)  Abstraction: intact   Insight: fair  Judgment: fair    Laboratory:  Recent Results (from the past 48 hour(s))   Toxicology screen, urine    Collection Time: 02/01/17  2:35 PM   Result Value Ref Range    Alcohol, Urine <10 <10 mg/dL    Benzodiazepines Presumptive Positive     Methadone metabolites Negative     Cocaine (Metab.) Negative     Opiate Scrn, Ur Negative     Barbiturate Screen, Ur Negative     Amphetamine Screen, Ur Negative     THC Negative     Phencyclidine Negative     Creatinine, Random Ur 266.0 23.0 - 375.0 mg/dL    Toxicology Information SEE COMMENT    POCT BREATH ALCOHOL TEST    Collection Time: 02/01/17  2:36 PM   Result Value Ref Range    Breath Alcohol 0.000    Toxicology screen, urine    Collection Time: 02/02/17 12:30 PM   Result Value Ref Range    Alcohol, Urine <10 <10 mg/dL    Benzodiazepines Presumptive Positive     Methadone metabolites Negative     Cocaine (Metab.) Negative     Opiate Scrn, Ur Negative     Barbiturate Screen, Ur Negative     Amphetamine Screen, Ur Negative     THC Negative     Phencyclidine Negative     Creatinine, Random Ur 52.0 23.0 - 375.0 mg/dL    Toxicology Information SEE COMMENT    POCT BREATH ALCOHOL TEST    Collection Time: 02/02/17 12:32 PM   Result Value Ref Range    Breath Alcohol 0.000          ASSESSMENT/PLAN:  Alcohol Use Disorder, severe  Alcohol Withdrawal  PTSD    Status:  Continue treatment on ABU    Plan:   1. Continue ABU protocol.  2. Breathalyzer and Urine toxicology daily.  3. VS daily x 3 days.  4. CBC, CMP, B-12, TSH, Folate (will get " today)  5. Patient counseled on abstinence from all substances (except benzodiazepines for now).    Patient's Intervention Response: accepting    Medications:    -continue thiamine   -will consider Naltrexone once patient has cleared alcohol withdrawal  -labs pending, ordered as future  - revised Valium taper:  10 mg TID 1/31, 10 mg BID 2/1, 5 mg TID 2/2, 5 BID mg 2/3.    - Prescription for 5 mg tablets, disp 7# 2/1.    - Pt took 10mg this morning, instead of 5, recommended that he take 5mg this evening      Additional Comments: none    Francia Dhillon MD, MPH  [facility]   [facility]      Attending Attestation:    I have independently evaluated the patient and discussed the case with the resident. I have reviewed this note and agree with its contents, as well as the assessment and plan.     Kaity Stevens MD

## 2017-02-03 NOTE — PLAN OF CARE
02/03/17 1000   Activity/Group Therapy Checklist   Group Addiction Education  (Gender and cultural factors in alcoholism)   Attendance Attended   Follows Direction Followed directions   Group Interactions/Observations Interacted appropriately   Affect/Mood Range Normal range   Affect/Mood Display Appropriate   Goal Progression Progressing

## 2017-02-06 ENCOUNTER — HOSPITAL ENCOUNTER (OUTPATIENT)
Dept: PSYCHIATRY | Facility: HOSPITAL | Age: 52
Discharge: HOME | End: 2017-02-06
Attending: PSYCHIATRY & NEUROLOGY
Payer: COMMERCIAL

## 2017-02-06 VITALS — DIASTOLIC BLOOD PRESSURE: 84 MMHG | HEART RATE: 88 BPM | RESPIRATION RATE: 16 BRPM | SYSTOLIC BLOOD PRESSURE: 154 MMHG

## 2017-02-06 DIAGNOSIS — F10.10 ALCOHOL ABUSE: ICD-10-CM

## 2017-02-06 DIAGNOSIS — F10.20 ALCOHOL USE DISORDER, SEVERE, DEPENDENCE: Primary | ICD-10-CM

## 2017-02-06 LAB
AMPHET+METHAMPHET UR QL: NEGATIVE
BARBITURATES UR QL SCN>200 NG/ML: NEGATIVE
BENZODIAZ UR QL SCN>200 NG/ML: NORMAL
BREATH ALCOHOL: 0
BZE UR QL SCN: NEGATIVE
CANNABINOIDS UR QL SCN: NEGATIVE
CREAT UR-MCNC: 95 MG/DL
ETHANOL UR-MCNC: <10 MG/DL
METHADONE UR QL SCN>300 NG/ML: NEGATIVE
OPIATES UR QL SCN: NEGATIVE
PCP UR QL SCN>25 NG/ML: NEGATIVE
TOXICOLOGY INFORMATION: NORMAL

## 2017-02-06 PROCEDURE — 99232 SBSQ HOSP IP/OBS MODERATE 35: CPT | Mod: ,,, | Performed by: PSYCHIATRY & NEUROLOGY

## 2017-02-06 PROCEDURE — 90853 GROUP PSYCHOTHERAPY: CPT | Performed by: SOCIAL WORKER

## 2017-02-06 PROCEDURE — 80307 DRUG TEST PRSMV CHEM ANLYZR: CPT

## 2017-02-06 PROCEDURE — 90853 GROUP PSYCHOTHERAPY: CPT | Mod: 59,,, | Performed by: PSYCHOLOGIST

## 2017-02-06 PROCEDURE — 90853 GROUP PSYCHOTHERAPY: CPT

## 2017-02-06 PROCEDURE — 90853 GROUP PSYCHOTHERAPY: CPT | Mod: ,,, | Performed by: PSYCHOLOGIST

## 2017-02-06 NOTE — PROGRESS NOTES
PSYCHIATRY  ABU Partial Hospitalization  PROGRESS NOTE    Patient Name: Eros Vital  2017  10:08 AM  Start Date: 2017  : 1965    Status: Intensive Outpatient Program (IOP)  CC:  Alcohol use disorder    SUBJECTIVE:    Mr. Vital had a good weekend. He looks better, and his gait appears improved. He expresses feeling a lot of frustration about his leg weakness. He tried to walk to a meeting yesterday which was 6 blocks away, but didn't make it there. This evening he is determined to go, even if it means calling uber. Otherwise, he is handling things pretty well. He denies any side effects to medication, denies any cravings. Appetite and sleep are unchanged.      Medication Side Effects: none  Cravings: denied  No other acute psychiatric issues reported at this time.    Scheduled Meds:   Current Outpatient Prescriptions on File Prior to Encounter   Medication Sig Dispense Refill    diazePAM (VALIUM) 5 MG tablet Take four pills on day one. Take three pills on day two. On the last two days, take one pill daily. 8 tablet 0     No current facility-administered medications on file prior to encounter.      ALLERGIES:  Review of patient's allergies indicates:  No Known Allergies    Vitals:  BP: 154/84  HR: 88    Psychiatric Review Of Systems - Is patient experiencing or having changes in:  sleep: no changes  appetite: no changes  weight: no changes  energy/anergy: no changes  interest/pleasure/anhedonia: no changes  somatic symptoms: persistent leg weakness  libido: no changes  anxiety/panic: no changes  guilty/hopelessness: no changes  concentration: no changes  S.I.B.s/risky behavior: no changes  Irritability: no changes  Racing thoughts: no changes  Impulsive behaviors: no changes  Paranoia: no changes  AVH: no changes    Medical ROS:  See Dr. Waller/Rodney's Admission Note of date 2017 for ROS.     OBJECTIVE:    Vital Signs (Most Recent)  BP: 138/80  HR: 101    Mental Status Exam:  Appearance: older  "appearing than stated age, white male wearing casual clothes in NAD; hygiene improved, with improving tremors  Behavior: calm, cooperative  Speech: normal rate, tone, and volume  Mood: "good"  Affect: full  Thought Process: linear  Thought Perceptions: denied AVH  Thought Content: denied SI, HI; no delusions apparent  Sensorium: awake, alert  Attention/Concentration: intact to conversation  Orientation: person, place, time, and situation  Memory: intact (recent, remote)  Abstraction: intact   Insight: fair  Judgment: fair    Laboratory:  No results found for this or any previous visit (from the past 48 hour(s)).      ASSESSMENT/PLAN:  Alcohol Use Disorder, severe  Alcohol Withdrawal  PTSD    Status:  Continue treatment on ABU    Plan:   1. Continue ABU protocol.  2. Breathalyzer and Urine toxicology daily.  3. VS daily x 3 days.  4. CBC, CMP, B-12, TSH, Folate (will get today)  5. Patient counseled on abstinence from all substances (except benzodiazepines for now).      Patient's Intervention Response: accepting    Medications:    -continue thiamine   -will consider Naltrexone once patient has cleared alcohol withdrawal  -labs should be done today, CBC, CMP, TSH, folate, B12, Mg  - Valium taper completed    Additional Comments: none    Francia Dhillon MD, MPH  [facility]   [facility]      Attending Attestation:    I have independently evaluated the patient and discussed the case with the resident. I have reviewed this note and agree with its contents, as well as the assessment and plan. Now that valium taper complete will continue to reassess gait and mental status, pt reports gradual improvement.    Kaity Stevens MD  "

## 2017-02-06 NOTE — PLAN OF CARE
02/06/17 1000   Activity/Group Therapy Checklist   Group Addiction Education  (Peer presented life story; feedback given. )   Attendance Attended   Follows Direction Followed directions   Group Interactions/Observations Interacted appropriately   Affect/Mood Range Normal range   Affect/Mood Display Appropriate   Goal Progression Progressing

## 2017-02-06 NOTE — PROGRESS NOTES
Sw placed a call to pt to inquire about coming into program. No answer sw left a vm for immediate call back.      Florina Colin LMSW

## 2017-02-06 NOTE — PROGRESS NOTES
Group Psychotherapy (PhD/LCSW)    Site: [facility]    Clinical status of patient: Intensive Outpatient Program (IOP)    Date: 2/6/2017    Group Focus: Psychodynamic Group Psychotherapy    Length of service: 92462 - 45-50 minutes    Number of patients in attendance: 12    Referred by: Addictive Behavior Unit Treatment Team    Target symptoms: Alcohol Abuse    Patient's response to treatment: Active Listening and Self-disclosure    Progress toward goals: Progressing adequately    Interval History: Shared his story with new group member and discussed lessons learned in recovery.    Diagnosis: alcohol use disorder, severe, dependence    Plan: Continue treatment on ABU

## 2017-02-06 NOTE — PROGRESS NOTES
Group Psychotherapy (PhD/LCSW)    Site: [facility]    Clinical status of patient: Intensive Outpatient Program (IOP)    Date: 2/6/2017    Group Focus: Communication Skills       Length of service: 52846 - 45-50 minutes    Number of patients in attendance: 20    Referred by: Addictive Behavior Unit Treatment Team    Target symptoms: Alcohol Abuse    Patient's response to treatment: Active Listening     Progress toward goals: Progressing adequately    Interval History: Discussed basic communication skills (I-messages, Reflective Listening, Contextual considerations) and modeled the way they can be used to overcome communication problems and enhance relationship dynamics    Diagnosis: alcohol use disorder, severe, dependence    Plan: Continue treatment on ABU

## 2017-02-07 ENCOUNTER — HOSPITAL ENCOUNTER (OUTPATIENT)
Dept: PSYCHIATRY | Facility: HOSPITAL | Age: 52
Discharge: HOME | End: 2017-02-07
Attending: PSYCHIATRY & NEUROLOGY
Payer: COMMERCIAL

## 2017-02-07 ENCOUNTER — HOSPITAL ENCOUNTER (INPATIENT)
Facility: HOSPITAL | Age: 52
LOS: 2 days | Discharge: HOME | DRG: 641 | End: 2017-02-09
Attending: FAMILY MEDICINE | Admitting: HOSPITALIST
Payer: COMMERCIAL

## 2017-02-07 DIAGNOSIS — F10.10 ALCOHOL ABUSE: ICD-10-CM

## 2017-02-07 DIAGNOSIS — F10.20 ALCOHOL USE DISORDER, SEVERE, DEPENDENCE: ICD-10-CM

## 2017-02-07 DIAGNOSIS — E51.2 WERNICKE ENCEPHALOPATHY: Primary | ICD-10-CM

## 2017-02-07 DIAGNOSIS — R27.0 ATAXIA: ICD-10-CM

## 2017-02-07 DIAGNOSIS — F10.20 ALCOHOL USE DISORDER, SEVERE, DEPENDENCE: Primary | ICD-10-CM

## 2017-02-07 PROBLEM — E83.42 HYPOMAGNESEMIA: Status: ACTIVE | Noted: 2017-02-07

## 2017-02-07 PROBLEM — Z72.0 TOBACCO USER: Status: ACTIVE | Noted: 2017-02-07

## 2017-02-07 PROBLEM — H53.459 PERIPHERAL VISION LOSS: Status: ACTIVE | Noted: 2017-02-07

## 2017-02-07 PROBLEM — D53.9 MACROCYTIC ANEMIA: Status: ACTIVE | Noted: 2017-02-07

## 2017-02-07 PROBLEM — R74.01 TRANSAMINITIS: Status: ACTIVE | Noted: 2017-02-07

## 2017-02-07 LAB
ALBUMIN SERPL BCP-MCNC: 3.7 G/DL
ALP SERPL-CCNC: 151 U/L
ALT SERPL W/O P-5'-P-CCNC: 114 U/L
AMPHET+METHAMPHET UR QL: NEGATIVE
ANION GAP SERPL CALC-SCNC: 8 MMOL/L
AST SERPL-CCNC: 70 U/L
BARBITURATES UR QL SCN>200 NG/ML: NEGATIVE
BASOPHILS # BLD AUTO: 0.08 K/UL
BASOPHILS NFR BLD: 1.2 %
BENZODIAZ UR QL SCN>200 NG/ML: NORMAL
BILIRUB SERPL-MCNC: 0.5 MG/DL
BREATH ALCOHOL: 0
BUN SERPL-MCNC: 6 MG/DL
BZE UR QL SCN: NEGATIVE
CALCIUM SERPL-MCNC: 9.4 MG/DL
CANNABINOIDS UR QL SCN: NEGATIVE
CHLORIDE SERPL-SCNC: 104 MMOL/L
CO2 SERPL-SCNC: 29 MMOL/L
CREAT SERPL-MCNC: 0.7 MG/DL
CREAT UR-MCNC: 80 MG/DL
DIFFERENTIAL METHOD: ABNORMAL
EOSINOPHIL # BLD AUTO: 0.2 K/UL
EOSINOPHIL NFR BLD: 2.6 %
ERYTHROCYTE [DISTWIDTH] IN BLOOD BY AUTOMATED COUNT: 12.2 %
EST. GFR  (AFRICAN AMERICAN): >60 ML/MIN/1.73 M^2
EST. GFR  (NON AFRICAN AMERICAN): >60 ML/MIN/1.73 M^2
ETHANOL UR-MCNC: <10 MG/DL
GLUCOSE SERPL-MCNC: 99 MG/DL
HCT VFR BLD AUTO: 40.1 %
HGB BLD-MCNC: 13.4 G/DL
LYMPHOCYTES # BLD AUTO: 0.9 K/UL
LYMPHOCYTES NFR BLD: 12.6 %
MAGNESIUM SERPL-MCNC: 1.3 MG/DL
MCH RBC QN AUTO: 34.5 PG
MCHC RBC AUTO-ENTMCNC: 33.4 %
MCV RBC AUTO: 103 FL
METHADONE UR QL SCN>300 NG/ML: NEGATIVE
MONOCYTES # BLD AUTO: 1.5 K/UL
MONOCYTES NFR BLD: 21.8 %
NEUTROPHILS # BLD AUTO: 4.2 K/UL
NEUTROPHILS NFR BLD: 61.5 %
OPIATES UR QL SCN: NEGATIVE
PCP UR QL SCN>25 NG/ML: NEGATIVE
PLATELET # BLD AUTO: 337 K/UL
PMV BLD AUTO: 9.5 FL
POTASSIUM SERPL-SCNC: 3.7 MMOL/L
PROT SERPL-MCNC: 7.3 G/DL
RBC # BLD AUTO: 3.88 M/UL
SODIUM SERPL-SCNC: 141 MMOL/L
TOXICOLOGY INFORMATION: NORMAL
VIT B12 SERPL-MCNC: 973 PG/ML
WBC # BLD AUTO: 6.8 K/UL

## 2017-02-07 PROCEDURE — 99223 1ST HOSP IP/OBS HIGH 75: CPT | Mod: ,,, | Performed by: HOSPITALIST

## 2017-02-07 PROCEDURE — 12000002 HC ACUTE/MED SURGE SEMI-PRIVATE ROOM

## 2017-02-07 PROCEDURE — 85025 COMPLETE CBC W/AUTO DIFF WBC: CPT

## 2017-02-07 PROCEDURE — 82607 VITAMIN B-12: CPT

## 2017-02-07 PROCEDURE — 25000003 PHARM REV CODE 250: Performed by: STUDENT IN AN ORGANIZED HEALTH CARE EDUCATION/TRAINING PROGRAM

## 2017-02-07 PROCEDURE — 99285 EMERGENCY DEPT VISIT HI MDM: CPT | Mod: ,,, | Performed by: FAMILY MEDICINE

## 2017-02-07 PROCEDURE — 90853 GROUP PSYCHOTHERAPY: CPT | Mod: 59,,, | Performed by: PSYCHOLOGIST

## 2017-02-07 PROCEDURE — 83735 ASSAY OF MAGNESIUM: CPT

## 2017-02-07 PROCEDURE — 96365 THER/PROPH/DIAG IV INF INIT: CPT

## 2017-02-07 PROCEDURE — 90853 GROUP PSYCHOTHERAPY: CPT | Mod: ,,, | Performed by: PSYCHOLOGIST

## 2017-02-07 PROCEDURE — 96366 THER/PROPH/DIAG IV INF ADDON: CPT

## 2017-02-07 PROCEDURE — 99285 EMERGENCY DEPT VISIT HI MDM: CPT | Mod: 25

## 2017-02-07 PROCEDURE — 25000003 PHARM REV CODE 250: Performed by: FAMILY MEDICINE

## 2017-02-07 PROCEDURE — 90853 GROUP PSYCHOTHERAPY: CPT

## 2017-02-07 PROCEDURE — 80053 COMPREHEN METABOLIC PANEL: CPT

## 2017-02-07 PROCEDURE — 84425 ASSAY OF VITAMIN B-1: CPT

## 2017-02-07 PROCEDURE — 63600175 PHARM REV CODE 636 W HCPCS: Performed by: FAMILY MEDICINE

## 2017-02-07 PROCEDURE — 63600175 PHARM REV CODE 636 W HCPCS: Performed by: STUDENT IN AN ORGANIZED HEALTH CARE EDUCATION/TRAINING PROGRAM

## 2017-02-07 PROCEDURE — 93005 ELECTROCARDIOGRAM TRACING: CPT

## 2017-02-07 PROCEDURE — 96367 TX/PROPH/DG ADDL SEQ IV INF: CPT

## 2017-02-07 PROCEDURE — 93010 ELECTROCARDIOGRAM REPORT: CPT | Mod: ,,, | Performed by: INTERNAL MEDICINE

## 2017-02-07 PROCEDURE — 99233 SBSQ HOSP IP/OBS HIGH 50: CPT | Mod: ,,, | Performed by: PSYCHIATRY & NEUROLOGY

## 2017-02-07 PROCEDURE — 80307 DRUG TEST PRSMV CHEM ANLYZR: CPT

## 2017-02-07 RX ORDER — MAGNESIUM SULFATE HEPTAHYDRATE 40 MG/ML
2 INJECTION, SOLUTION INTRAVENOUS
Status: COMPLETED | OUTPATIENT
Start: 2017-02-07 | End: 2017-02-08

## 2017-02-07 RX ORDER — GLUCAGON 1 MG
1 KIT INJECTION
Status: DISCONTINUED | OUTPATIENT
Start: 2017-02-07 | End: 2017-02-09 | Stop reason: HOSPADM

## 2017-02-07 RX ORDER — IBUPROFEN 200 MG
16 TABLET ORAL
Status: DISCONTINUED | OUTPATIENT
Start: 2017-02-07 | End: 2017-02-09 | Stop reason: HOSPADM

## 2017-02-07 RX ORDER — FLUTICASONE PROPIONATE 50 MCG
2 SPRAY, SUSPENSION (ML) NASAL DAILY
Status: DISCONTINUED | OUTPATIENT
Start: 2017-02-08 | End: 2017-02-09 | Stop reason: HOSPADM

## 2017-02-07 RX ORDER — ENOXAPARIN SODIUM 100 MG/ML
40 INJECTION SUBCUTANEOUS EVERY 24 HOURS
Status: DISCONTINUED | OUTPATIENT
Start: 2017-02-07 | End: 2017-02-09 | Stop reason: HOSPADM

## 2017-02-07 RX ORDER — CETIRIZINE HYDROCHLORIDE 5 MG/1
5 TABLET ORAL DAILY
Status: DISCONTINUED | OUTPATIENT
Start: 2017-02-08 | End: 2017-02-09 | Stop reason: HOSPADM

## 2017-02-07 RX ORDER — IBUPROFEN 200 MG
1 TABLET ORAL DAILY
Status: DISCONTINUED | OUTPATIENT
Start: 2017-02-07 | End: 2017-02-09 | Stop reason: HOSPADM

## 2017-02-07 RX ORDER — IBUPROFEN 200 MG
24 TABLET ORAL
Status: DISCONTINUED | OUTPATIENT
Start: 2017-02-07 | End: 2017-02-09 | Stop reason: HOSPADM

## 2017-02-07 RX ORDER — THIAMINE HYDROCHLORIDE 100 MG/ML
250 INJECTION, SOLUTION INTRAMUSCULAR; INTRAVENOUS DAILY
Qty: 12.5 ML | Refills: 0 | Status: SHIPPED | OUTPATIENT
Start: 2017-02-07 | End: 2017-02-07

## 2017-02-07 RX ADMIN — MAGNESIUM SULFATE IN WATER 2 G: 40 INJECTION, SOLUTION INTRAVENOUS at 10:02

## 2017-02-07 RX ADMIN — THIAMINE HYDROCHLORIDE 500 MG: 100 INJECTION, SOLUTION INTRAMUSCULAR; INTRAVENOUS at 04:02

## 2017-02-07 RX ADMIN — NICOTINE 1 PATCH: 21 PATCH, EXTENDED RELEASE TRANSDERMAL at 10:02

## 2017-02-07 RX ADMIN — MAGNESIUM SULFATE IN WATER 2 G: 40 INJECTION, SOLUTION INTRAVENOUS at 08:02

## 2017-02-07 NOTE — PROVIDER PROGRESS NOTES - EMERGENCY DEPT.
Encounter Date: 2/7/2017    ED Physician Progress Notes        Physician Note:   Normal sinus rhythm with prolonged QT.  No prior EKGs for comparison    EKG - STEMI Decision  Initial Reading: No STEMI present.  Response: No Action Needed.

## 2017-02-07 NOTE — IP AVS SNAPSHOT
[facility]  [address]  [facility] [address]  Phone: [phone]           Patient Discharge Instructions     Our goal is to set you up for success. This packet includes information on your condition, medications, and your home care. It will help you to care for yourself so you don't get sicker and need to go back to the hospital.     Please ask your nurse if you have any questions.        There are many details to remember when preparing to leave the hospital. Here is what you will need to do:    1. Take your medicine. If you are prescribed medications, review your Medication List in the following pages. You may have new medications to  at the pharmacy and others that you'll need to stop taking. Review the instructions for how and when to take your medications. Talk with your doctor or nurses if you are unsure of what to do.     2. Go to your follow-up appointments. Specific follow-up information is listed in the following pages. Your may be contacted by a transition nurse or clinical provider about future appointments. Be sure we have all of the phone numbers to reach you, if needed. Please contact your provider's office if you are unable to make an appointment.     3. Watch for warning signs. Your doctor or nurse will give you detailed warning signs to watch for and when to call for assistance. These instructions may also include educational information about your condition. If you experience any of warning signs to your health, call your doctor.               [facility]  Unless otherwise directed by your provider, please contact [facility], our nurse care line that is available for 24/7 assistance.     [phone] (toll-free)    Registered nurses in the [facility] provide clinical advisement, health education, appointment booking, and other advisory services.                    ** Verify the list of medication(s) below is accurate and up  to date. Carry this with you in case of emergency. If your medications have changed, please notify your healthcare provider.             Medication List      START taking these medications        Additional Info                      nicotine 21 mg/24 hr   Commonly known as:  NICODERM CQ   Refills:  0   Dose:  1 patch    Last time this was given:  1 patch on 2/8/2017  8:19 AM   Instructions:  Place 1 patch onto the skin once daily.     Begin Date    AM    Noon    PM    Bedtime       thiamine 100 MG tablet   Refills:  0   Dose:  100 mg   Replaces:  thiamine 100 mg/mL injection    Instructions:  Take 1 tablet (100 mg total) by mouth once daily.     Begin Date    AM    Noon    PM    Bedtime         STOP taking these medications     diazePAM 5 MG tablet   Commonly known as:  VALIUM       thiamine 100 mg/mL injection   Commonly known as:  B-1   Replaced by:  thiamine 100 MG tablet            Where to Get Your Medications      You can get these medications from any pharmacy     You don't need a prescription for these medications     nicotine 21 mg/24 hr    thiamine 100 MG tablet                  Please bring to all follow up appointments:    1. A copy of your discharge instructions.  2. All medicines you are currently taking in their original bottles.  3. Identification and insurance card.    Please arrive 15 minutes ahead of scheduled appointment time.    Please call 24 hours in advance if you must reschedule your appointment and/or time.        Follow-up Information     Follow up with Paco Wagner MD In 1 month.    Specialty:  Internal Medicine    Contact information:    [facility] [place] [address] [phone]          Follow up with Reji Jaimes - Neurology In 2 weeks.    Specialty:  Neurology    Contact information:    [address]  [facility] [address] [phone]    Additional information:    7th Floor - [facility] [place]      Referrals     Future Orders    Ambulatory Referral to  Physical/Occupational Therapy     Questions:    Post Surgical?:  No    Eval and Treat:  Yes    Duration:  90 days    Frequency (times per week):      Precautions:      Location:      OT Location:      Restore Functional ADL Training?:  Yes    Gait Training:  Yes    Therapeutic Exercises:      Wound Care:      Traction:      Electric Stimulation:      IONTO.:      U/S:      Developmental Stimulation?:      Specialty Programs:          Discharge Instructions     Future Orders    Call MD for:  difficulty breathing or increased cough     Call MD for:  increased confusion or weakness     Call MD for:  persistent dizziness, light-headedness, or visual disturbances     Call MD for:  persistent nausea and vomiting or diarrhea     Call MD for:  redness, tenderness, or signs of infection (pain, swelling, redness, odor or green/yellow discharge around incision site)     Call MD for:  severe persistent headache     Call MD for:  severe uncontrolled pain     Call MD for:  temperature >100.4     Call MD for:  worsening rash     Diet general     Questions:    Total calories:      Fat restriction, if any:      Protein restriction, if any:      Na restriction, if any:      Fluid restriction:      Additional restrictions:          Primary Diagnosis     Your primary diagnosis was:  Wernicke's Disease      Admission Information     Date & Time Provider Department [facility]    2/7/2017  2:45 PM Madhuri Crandall MD [facility] [id number]      Care Providers     Provider Role Specialty Primary office phone    Madhuri Crandall MD Attending Provider Hospitalist [phone]    Madhuri Crandall MD Team Attending  Hospitalist [phone]      Your Vitals Were     BP Pulse Temp Resp Height Weight    155/74 (BP Location: Left arm, Patient Position: Lying, BP Method: Automatic) 78 97.9 °F (36.6 °C) (Oral) 14 6' (1.829 m) 65.8 kg (145 lb)    SpO2 BMI             96% 19.67 kg/m2         Recent Lab Values     No lab values to display.       Pending Labs     Order Current Status    Vitamin B1 In process      Allergies as of 2/9/2017     No Known Allergies      Advance Directives     An advance directive is a document which, in the event you are no longer able to make decisions for yourself, tells your healthcare team what kind of treatment you do or do not want to receive, or who you would like to make those decisions for you.  If you do not currently have an advance directive, [facility] encourages you to create one.  For more information call:  [phone]), [phone] ([phone]),  or log on to [email or web address].        Smoking Cessation     If you would like to quit smoking:   You may be eligible for free services if you are a [place] resident and started smoking cigarettes before September 1, 1988.  Call the Smoking Cessation Trust (SCT) toll free at (365) 909-7596 or (979) 747-1312.   Call 5-729-QUIT-NOW if you do not meet the above criteria.            Language Assistance Services     ATTENTION: Language assistance services are available, free of charge. Please call [phone].      ATENCIÓN: Si habla español, tiene a corea disposición servicios gratuitos de asistencia lingüística. Llame al [phone].     CHÚ Ý: N?u b?n nói Ti?ng Vi?t, có các d?ch v? h? tr? ngôn ng? mi?n phí dành cho b?n. G?i s? [phone].        [facility] Sign-Up     Activating your [facility] account is as easy as 1-2-3!     1) Visit [email or web address], select Sign Up Now, enter this activation code and your date of birth, then select Next.  [email or web address]  Expires: 3/26/2017 10:42 AM      2) Create a username and password to use when you visit [facility] in the future and select a security question in case you lose your password and select Next.    3) Enter your e-mail address and click Sign Up!    Additional Information  If you have questions, please e-mail [email or web address] or call [phone] to talk to our [facility] staff.  Remember, [facility] is NOT to be used for urgent needs. For medical emergencies, dial 911.          [facility] complies with applicable Federal civil rights laws and does not discriminate on the basis of race, color, national origin, age, disability, or sex.

## 2017-02-07 NOTE — ED PROVIDER NOTES
Encounter Date: 2/7/2017       History     Chief Complaint   Patient presents with    Multiple Complaints     loss of peripheral vision, unsteady gait (both symptoms persistent x weeks states pt)/ states was told to go to ER from Group Therapy/ recovering alcoholic (states pt)     Review of patient's allergies indicates:  No Known Allergies  HPI Comments: This is a 51 year old male with a PMH of alcohol abuse who presents to the ED with a chief complaint of gait changes. Patient is currently under the care of our outpatient alcohol detox program, his last use was 10 days ago. Patient completed a 10 day valium taper for withdrawal symptoms. He reports a mild persistent hand tremor, bilateral lower extremity weakness and gait ataxia, which has been present since his alcohol cessation. He feels unsteady and has difficulty ambulating for long distances such as into the clinic for his visits. He also endorses a bilateral peripheral visual field defect. Patient wears glasses and has not had his prescription updated in 4 years.     He has a history of severe longstanding alcohol dependence prior to the onset of these symptoms. He is compliant with his current treatment course and committed to being sober at this time. He developed clear nasal drainage and a nonproductive cough yesterday. 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 Denies fever, chills, headache, neck pain/stiffness, chest pain, palpitations, N/V, abdominal pain, appetite changes, dysuria, frequency, decreased urination, diarrhea, constipation, unilateral extremity weakness. Has chronic numbness of the left lower leg.    The history is provided by the patient.     Past Medical History   Diagnosis Date    Alcohol abuse      No past medical history pertinent negatives.  Past Surgical History   Procedure Laterality Date    Appendectomy       History reviewed. No pertinent family history.  Social History   Substance Use Topics     Smoking status: Current Every Day Smoker     Packs/day: 0.50    Smokeless tobacco: None    Alcohol use No      Comment: 10 days sober     Review of Systems   Constitutional: Negative for activity change, appetite change, chills and fever.   HENT: Positive for congestion and rhinorrhea. Negative for ear pain and sore throat.    Respiratory: Positive for cough. Negative for chest tightness, shortness of breath and wheezing.    Cardiovascular: Negative for chest pain.   Gastrointestinal: Negative for abdominal pain, nausea and vomiting.   Genitourinary: Negative for difficulty urinating, dysuria, frequency and urgency.   Musculoskeletal: Negative for back pain.   Skin: Negative for rash.   Neurological: Positive for tremors and weakness. Negative for dizziness and headaches.        +gait changes   Hematological: Does not bruise/bleed easily.       Physical Exam   Initial Vitals   BP Pulse Resp Temp SpO2   02/07/17 1402 02/07/17 1402 02/07/17 1402 02/07/17 1402 02/07/17 1402   158/85 108 16 98.2 °F (36.8 °C) 98 %     Physical Exam    Constitutional: He appears well-developed and well-nourished.   HENT:   Head: Normocephalic and atraumatic.   Right Ear: Tympanic membrane and ear canal normal.   Left Ear: Tympanic membrane and ear canal normal.   Nose: Nose normal.   Mouth/Throat: Mucous membranes are pale and dry.   Eyes: Conjunctivae and EOM are normal. Pupils are equal, round, and reactive to light.   Neck: Normal range of motion. Neck supple.   Cardiovascular: Normal rate, regular rhythm and normal heart sounds.   Pulmonary/Chest: Breath sounds normal. No respiratory distress. He has no wheezes. He has no rhonchi. He has no rales.   Abdominal: Soft. Bowel sounds are normal. There is no tenderness. There is no rebound and no guarding.   Neurological: He is alert and oriented to person, place, and time. He has normal strength and normal reflexes. He displays tremor. No cranial nerve deficit. Coordination and gait  abnormal.   Reflex Scores:       Patellar reflexes are 2+ on the right side and 2+ on the left side.  Speech is clear and fluent. Decreased peripheral vision to confrontation. PERRLA.   There is horizontal nystagmus noted. No ptosis.  Face is symmetric with normal expressions.  Shoulder shrug is intact.  Tongue is midline.  Strength is full bilaterally with 5/5 strength at the hips.  Rapid alternating hand movements are symmetric. There is dysmetria of the finger-to-nose exam with difficulty on the left. Normal heel to shin.   Posture is normal, gait is wide based and slow.    Skin: Skin is warm and dry. No rash noted.   Psychiatric: He has a normal mood and affect. His speech is normal and behavior is normal. Thought content normal.         ED Course   Procedures  Labs Reviewed   CBC W/ AUTO DIFFERENTIAL - Abnormal; Notable for the following:        Result Value    RBC 3.88 (*)     Hemoglobin 13.4 (*)      (*)     MCH 34.5 (*)     Lymph # 0.9 (*)     Mono # 1.5 (*)     Lymph% 12.6 (*)     Mono% 21.8 (*)     All other components within normal limits   COMPREHENSIVE METABOLIC PANEL - Abnormal; Notable for the following:     Alkaline Phosphatase 151 (*)     AST 70 (*)      (*)     All other components within normal limits   MAGNESIUM - Abnormal; Notable for the following:     Magnesium 1.3 (*)     All other components within normal limits   VITAMIN B12 - Abnormal; Notable for the following:     Vitamin B-12 973 (*)     All other components within normal limits    Narrative:     ADDING ON B12 PER HOMERO SMITH MD   VITAMIN B12   VITAMIN B1    Narrative:     Receive In Basket/Push notification of result?->Yes        Imaging Results         MRI Brain Without Contrast (Final result) Result time:  02/07/17 19:27:18    Final result by David Whitaker MD (02/07/17 19:27:18)    Impression:        Volume loss which is increased for the patient's age.  No focal lesions are seen.      Electronically signed  by: ELENA MORENO MD  Date:     02/07/17  Time:    19:27     Narrative:    Exam: MRI brain without contrast    History: Ataxia    Findings: MRI is performed with routine sequences without contrast.  There is volume loss which is greater than expected for age involving both supratentorial and infratentorial structures.  No mass, hemorrhage, infarction, subdural collection, hydrocephalus or other acute finding is seen on this cranial MR examination.  No diffusion abnormality is seen suggest an acute infarction.                 Medical Decision Making:   History:   Old Medical Records: I decided to obtain old medical records.  Old Records Summarized: records from clinic visits.       <> Summary of Records: Reviewed patient's intake hx and progress notes from alcohol detoxification.   PT apparently has a persistent gait abnormality that is non-resolving despite appropriate detoxification and oral thiamine replacement.    Also noted today to have some visual deficits and somewhat flat affect.      Concern raised for Wernicke's encephalopathy and sent to ED for management.   ED Management:  IV thiamine started after intial ED evaluation.  500 mg in NS currently infusing.    Lab and CNS imaging being obtained to look for other causes ofacute altered mentatl status and cerebellar dysfunction, but pt should receive IV thiamine urgently while this evaluation is being done.    Initial dose in ED.  Admit to hospital medicine for add'l IV thiamine pending. Work-up and response to therapy.        Other:   I have discussed this case with another health care provider.       <> Summary of the Discussion: Admitted to hospital medicine.  See above.         APC / Resident Notes:   51 year old male with history of alcohol abuse presents with ataxia and peripheral vision defect.  On exam he is afebrile and nontoxic. HEENT exam with pale and dry mucous membranes. Peripheral vision is abnormal to confrontation. Patient has a fine tremor of  bilateral hands. His gait is wide stanced, careful, and slow.     Patient referred by  for IV Thiamine to treat Wernicke's encephalopathy.  I will obtain labs, give Thiamine in the ED, and obtain MRI to rule out additional CNS pathology. Advised patient of plan and he agrees with course of treatment. I discussed the care of this patient with my supervising MD.            Attending Attestation:     Physician Attestation Statement for NP/PA:   I have conducted a face to face encounter with this patient in addition to the NP/PA, due to Medical Complexity    Other NP/PA Attestation Additions:      Medical Decision Making: Patient given IV thiamine promptly in the emergency room after initial evaluation, workup for other causes of organic brain syndrome or acute delirium also initiated.  Patient admitted in stable condition                 ED Course     Clinical Impression:   The primary encounter diagnosis was Wernicke encephalopathy. Diagnoses of Ataxia and Alcohol use disorder, severe, dependence were also pertinent to this visit.    Disposition:   Disposition: Admitted  Condition: Stable       Estevan Hdez MD  02/07/17 1620       EDDIE Villalpando  02/07/17 7706       Estevan Hdez MD  02/08/17 5554

## 2017-02-07 NOTE — NURSING
Called infusion center about possible scheduling Thiamine injections for this pt. Per Dr. Stevens's order.  Received a voice mail  explaining the request. Requested to please call me to see if this was possible to schedule.

## 2017-02-07 NOTE — PROGRESS NOTES
Group Psychotherapy (PhD/LCSW)    Site: [facility]    Clinical status of patient: Intensive Outpatient Program (IOP)    Date: 2/7/2017    Group Focus: Disease Model of Addiction    Length of service: 94989 - 45-50 minutes    Number of patients in attendance: 12    Referred by: Addictive Behavior Unit Treatment Team    Target symptoms: Alcohol Abuse    Patient's response to treatment: Active Listening and Self-disclosure    Progress toward goals: Progressing adequately    Interval History: Discussed the basic neuropsychological concepts of the Disease Model of Addiction and how they relate to the subjective phenomena of addiction (powerlessness; euphoric recall; chronicity; relapse; etc). Discussed the value of understanding the Disease Model for sustaining long-term sobriety.     Diagnosis: alcohol use disorder, severe, dependence    Plan: Continue treatment on ABU

## 2017-02-07 NOTE — PROGRESS NOTES
Group Psychotherapy (PhD/LCSW)    Site: [facility]    Clinical status of patient: Intensive Outpatient Program (IOP)    Date: 2/7/2017    Group Focus: Acceptance and Commitment Therapy (ACT) Group Psychotherapy    Length of service: 37167 - 45-50 minutes    Number of patients in attendance: 19    Referred by: Addictive Behavior Unit Treatment Team    Target symptoms: Alcohol Abuse    Patient's response to treatment: Active Listening and Self-disclosure    Progress toward goals: Progressing adequately    Interval History: Session focus was Fusion and Defusion.  Patient was introduced to the concepts and provided with examples of exercises (i.e., Milk, milk, milk; labeling thoughts & feelings; using a song/different voice/slow speech).    Diagnosis: alcohol use disorder, severe, dependence    Plan: Continue treatment on ABU

## 2017-02-07 NOTE — ED NOTES
Two patient identifiers have been checked and are correct.     Appearance: Pt awake, alert & oriented to person, place & time. Pt in no acute distress at present time. Pt is clean and well groomed with clothes appropriately fastened.    Skin: Skin warm, dry & intact. Color consistent with ethnicity. Mucous membranes moist. No breakdown noted.  Musculoskeletal: Patient moving all extremities well, no obvious swelling or deformities noted.  Bilateral leg weakness  Respiratory: Respirations spontaneous, even, and non-labored. Visible chest rise noted. Airway is open and patent. No accessory muscle use noted.    Neurologic: Sensation is intact. Speech is clear and appropriate. Eyes open spontaneously, behavior appropriate to situation, follows commands, facial expression symmetrical, bilateral hand grasp equal and even, purposeful motor response noted.  Cardiac: All peripheral pulses present. No Bilateral lower extremity edema. Cap refill is <3 seconds.  Abdomen: Abdomen soft, non-tender to palpation.    : Pt reports no dysuria or hematuria.   HEENT: peripheral vision loss

## 2017-02-07 NOTE — ED NOTES
i spoke with Lencho because MRI was not able to complete the scan. Patient transfer back to [facility] from MRI. Infusing completed and MRI notified.

## 2017-02-07 NOTE — PLAN OF CARE
02/06/17 1400   Activity/Group Therapy Checklist   Group Goals/Reflection   Attendance Attended   Follows Direction Followed directions   Group Interactions/Observations Interacted appropriately   Affect/Mood Range Normal range   Affect/Mood Display Appropriate   Goal Progression Progressing
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
## 2017-02-07 NOTE — PLAN OF CARE
Resident Note:    Eros Vital is a 51 Male with chronic EtOh abuse who presents to the ED with acute gait changes. The patient is currently in the [facility] detox program with last drink ~10 days ago. The patient notes that the weakness and difficulty walking long distances has been present since entering rehab.    Wernicke's Encephalopathy  -+ gait ataxia   -Thiamine 500mg IV BID for four doses  -Baseline B1 ordered  -Neuro checks q4    Ataxia  -Concerning for progression of Wernicke's   -PT/OT ordered  -Fall precautions    Macrocytic Anemia  -B12 and B1 ordered    EtOH abuse  -Last drink ~10 days ago  -Completed valium taper  -CIWAAR qshift    Rhinorrhea  -Flonase  -Zyrtec  -Saline spray    Please see full H&P for further details    Alejandra Cuellar M.D.   PGY-3  [phone]

## 2017-02-07 NOTE — PROGRESS NOTES
Group Psychotherapy (PhD/LCSW)    Site: [facility]    Clinical status of patient: Intensive Outpatient Program (IOP)    Date: 2/7/2017    Group Focus: Psychodynamic Group Psychotherapy    Length of service: 61709 - 45-50 minutes    Number of patients in attendance: 12    Referred by: Addictive Behavior Unit Treatment Team    Target symptoms: Alcohol Abuse    Patient's response to treatment: Active Listening and Self-disclosure    Progress toward goals: Progressing adequately    Interval History: Discussed how to respond when unjustly accused or when someone is being confrontational..    Diagnosis: alcohol use disorder, severe, dependence    Plan: Continue treatment on ABU

## 2017-02-07 NOTE — NURSING
Instructed pt. That he needed to establish a primary care phy. Per Dr. Stevens;s order.  Had pt. Call to schedule a new appt.

## 2017-02-08 ENCOUNTER — HOSPITAL ENCOUNTER (OUTPATIENT)
Dept: PSYCHIATRY | Facility: HOSPITAL | Age: 52
Discharge: HOME | End: 2017-02-08
Attending: PSYCHIATRY & NEUROLOGY
Payer: COMMERCIAL

## 2017-02-08 LAB
ALBUMIN SERPL BCP-MCNC: 3.3 G/DL
ALP SERPL-CCNC: 131 U/L
ALT SERPL W/O P-5'-P-CCNC: 94 U/L
ANION GAP SERPL CALC-SCNC: 8 MMOL/L
AST SERPL-CCNC: 66 U/L
BASOPHILS # BLD AUTO: 0.09 K/UL
BASOPHILS NFR BLD: 1.4 %
BILIRUB SERPL-MCNC: 0.5 MG/DL
BUN SERPL-MCNC: 5 MG/DL
CALCIUM SERPL-MCNC: 8.6 MG/DL
CHLORIDE SERPL-SCNC: 105 MMOL/L
CO2 SERPL-SCNC: 25 MMOL/L
CREAT SERPL-MCNC: 0.6 MG/DL
DIFFERENTIAL METHOD: ABNORMAL
EOSINOPHIL # BLD AUTO: 0.3 K/UL
EOSINOPHIL NFR BLD: 3.9 %
ERYTHROCYTE [DISTWIDTH] IN BLOOD BY AUTOMATED COUNT: 12 %
EST. GFR  (AFRICAN AMERICAN): >60 ML/MIN/1.73 M^2
EST. GFR  (NON AFRICAN AMERICAN): >60 ML/MIN/1.73 M^2
GLUCOSE SERPL-MCNC: 96 MG/DL
HCT VFR BLD AUTO: 36.6 %
HGB BLD-MCNC: 12.5 G/DL
LYMPHOCYTES # BLD AUTO: 1.1 K/UL
LYMPHOCYTES NFR BLD: 16.2 %
MAGNESIUM SERPL-MCNC: 1.6 MG/DL
MCH RBC QN AUTO: 34.7 PG
MCHC RBC AUTO-ENTMCNC: 34.2 %
MCV RBC AUTO: 102 FL
MONOCYTES # BLD AUTO: 1.4 K/UL
MONOCYTES NFR BLD: 21.1 %
NEUTROPHILS # BLD AUTO: 3.7 K/UL
NEUTROPHILS NFR BLD: 56.9 %
PHOSPHATE SERPL-MCNC: 3.2 MG/DL
PLATELET # BLD AUTO: 332 K/UL
PMV BLD AUTO: 9.5 FL
POTASSIUM SERPL-SCNC: 3.2 MMOL/L
PROT SERPL-MCNC: 6.6 G/DL
RBC # BLD AUTO: 3.6 M/UL
SODIUM SERPL-SCNC: 138 MMOL/L
WBC # BLD AUTO: 6.49 K/UL

## 2017-02-08 PROCEDURE — 97161 PT EVAL LOW COMPLEX 20 MIN: CPT

## 2017-02-08 PROCEDURE — 99406 BEHAV CHNG SMOKING 3-10 MIN: CPT

## 2017-02-08 PROCEDURE — 99223 1ST HOSP IP/OBS HIGH 75: CPT | Mod: ,,, | Performed by: PSYCHIATRY & NEUROLOGY

## 2017-02-08 PROCEDURE — 25000003 PHARM REV CODE 250: Performed by: STUDENT IN AN ORGANIZED HEALTH CARE EDUCATION/TRAINING PROGRAM

## 2017-02-08 PROCEDURE — 83735 ASSAY OF MAGNESIUM: CPT

## 2017-02-08 PROCEDURE — 11000001 HC ACUTE MED/SURG PRIVATE ROOM

## 2017-02-08 PROCEDURE — 63600175 PHARM REV CODE 636 W HCPCS: Performed by: STUDENT IN AN ORGANIZED HEALTH CARE EDUCATION/TRAINING PROGRAM

## 2017-02-08 PROCEDURE — 80053 COMPREHEN METABOLIC PANEL: CPT

## 2017-02-08 PROCEDURE — 84100 ASSAY OF PHOSPHORUS: CPT

## 2017-02-08 PROCEDURE — 36415 COLL VENOUS BLD VENIPUNCTURE: CPT

## 2017-02-08 PROCEDURE — 85025 COMPLETE CBC W/AUTO DIFF WBC: CPT

## 2017-02-08 PROCEDURE — 97165 OT EVAL LOW COMPLEX 30 MIN: CPT

## 2017-02-08 RX ORDER — MAGNESIUM SULFATE HEPTAHYDRATE 40 MG/ML
2 INJECTION, SOLUTION INTRAVENOUS ONCE
Status: COMPLETED | OUTPATIENT
Start: 2017-02-08 | End: 2017-02-08

## 2017-02-08 RX ADMIN — THIAMINE HYDROCHLORIDE 500 MG: 100 INJECTION, SOLUTION INTRAMUSCULAR; INTRAVENOUS at 10:02

## 2017-02-08 RX ADMIN — NICOTINE 1 PATCH: 21 PATCH, EXTENDED RELEASE TRANSDERMAL at 08:02

## 2017-02-08 RX ADMIN — ENOXAPARIN SODIUM 40 MG: 100 INJECTION SUBCUTANEOUS at 11:02

## 2017-02-08 RX ADMIN — CETIRIZINE HYDROCHLORIDE 5 MG: 5 TABLET, FILM COATED ORAL at 08:02

## 2017-02-08 RX ADMIN — FLUTICASONE PROPIONATE 2 SPRAY: 50 SPRAY, METERED NASAL at 08:02

## 2017-02-08 RX ADMIN — MAGNESIUM SULFATE IN WATER 2 G: 40 INJECTION, SOLUTION INTRAVENOUS at 08:02

## 2017-02-08 RX ADMIN — THIAMINE HYDROCHLORIDE 500 MG: 100 INJECTION, SOLUTION INTRAMUSCULAR; INTRAVENOUS at 01:02

## 2017-02-08 RX ADMIN — POTASSIUM BICARBONATE 50 MEQ: 25 TABLET, EFFERVESCENT ORAL at 08:02

## 2017-02-08 RX ADMIN — THIAMINE HYDROCHLORIDE 500 MG: 100 INJECTION, SOLUTION INTRAMUSCULAR; INTRAVENOUS at 05:02

## 2017-02-08 NOTE — PT/OT/SLP EVAL
"Occupational Therapy  Evaluation & Discharge    Eros Vital   MRN: [id number]   Admitting Diagnosis: Wernicke encephalopathy    OT Date of Treatment: 17   OT Start Time: 820  OT Stop Time: 838  OT Total Time (min): 18 min    Billable Minutes:  Evaluation 18 min   Co-eval with PT    Diagnosis: Wernicke encephalopathy       Past Medical History   Diagnosis Date    Alcohol abuse       Past Surgical History   Procedure Laterality Date    Appendectomy         Referring physician: Dr. Cuellar  Date referred to OT: 17    General Precautions: Standard, fall  Orthopedic Precautions: N/A  Braces: N/A          Patient History:  Living Environment  Lives With: spouse  Living Arrangements: house  Home Accessibility: stairs within home  Living Environment Comment:  (Pt lives in a 2 story home wiht wife - no steps to enter - bed and bath located on 1st floor - PLOF was I with ADLs and mobility - pt has 24 hrs assist - pt has walk in shower with bench)  Equipment Currently Used at Home: cane, straight (pt has shower bench and grab bars in bathroom available)    Prior level of function:   Bed Mobility/Transfers: independent  Grooming: independent  Bathing: independent  Upper Body Dressing: independent  Lower Body Dressing: independent  Toileting: independent  Home Management Skills: independent  Mode of Transportation: Family  Occupation: Full time employment  Type of Occupation: owns [employer]     Dominant hand: right    Subjective:  Communicated with nurse prior to session.  "I feel ok, it's just my legs that are unsteady."  Chief Complaint: difficulty with ambulation  Patient/Family stated goals: safe ambulation    Pain Ratin/10   Pain Rating Post-Intervention: 0/10    Objective:  Patient found with: peripheral IV    Cognitive Exam:  Oriented to: Person, Place, Time and Situation  Follows Commands/attention: Follows multistep  commands  Communication: clear/fluent  Memory:  No Deficits noted  Safety " awareness/insight to disability: intact  Coping skills/emotional control: Appropriate to situation    Visual/perceptual:  Intact    Physical Exam:  Postural examination/scapula alignment: No postural abnormalities identified  Skin integrity: Visible skin intact  Edema: None noted in UEs    Sensation:   Intact except for L shin    Upper Extremity Range of Motion:  Right Upper Extremity: WFL  Left Upper Extremity: WFL    Upper Extremity Strength:  Right Upper Extremity: WFL  Left Upper Extremity: WFL   Strength: good bilaterally    Fine motor coordination:   Intact    Gross motor coordination: WFL    Functional Mobility:  Bed Mobility:  Rolling/Turning to Left: Modified independent  Rolling/Turning Right: Modified independent  Scooting/Bridging: Modified Independent  Supine to Sit: Modified Independent  Sit to Supine: Modified Independent    Transfers:  Sit <> Stand Assistance: Supervision  Sit <> Stand Assistive Device: No Assistive Device  Toilet Transfer Technique: Stand Pivot  Toilet Transfer Assistance: Contact Guard Assistance (CGA needed due to short height of commode)  Toilet Transfer Assistive Device: No Assistive Device    Functional Ambulation: Pt able to down hospital hallway with S - refer to PT evaluation note for further information.    Activities of Daily Living:  Feeding Level of Assistance: Activity did not occur    UE Dressing Level of Assistance: Set-up Assistance (hospital gown in sitting)    LE Dressing Level of Assistance: Set-up Assistance (pt donned socks while seated on EOB)    Grooming Position: Standing at sink  Grooming Level of Assistance: Supervision     Balance:   Static Sit: GOOD: Takes MODERATE challenges from all directions  Dynamic Sit: GOOD: Maintains balance through MODERATE excursions of active trunk movement  Static Stand: FAIR+: Takes MINIMAL challenges from all directions  Dynamic stand: GOOD-: Needs SUPERVISION only during gait and able to self right with moderate  "    Therapeutic Activities and Exercises:  · Pt completed ADLs and func mobility during evaluation.  · Pt educated on safety during transfers and use of shower bench when home for safety    AM-PAC 6 CLICK ADL  How much help from another person does this patient currently need?  1 = Unable, Total/Dependent Assistance  2 = A lot, Maximum/Moderate Assistance  3 = A little, Minimum/Contact Guard/Supervision  4 = None, Modified [place]/Independent    Putting on and taking off regular lower body clothing? : 4  Bathing (including washing, rinsing, drying)?: 3  Toileting, which includes using toilet, bedpan, or urinal? : 4  Putting on and taking off regular upper body clothing?: 4  Taking care of personal grooming such as brushing teeth?: 4  Eating meals?: 4  Total Score: 23    AM-PAC Raw Score CMS "G-Code Modifier Level of Impairment Assistance   6 % Total / Unable   7 - 9 CM 80 - 100% Maximal Assist   10 - 14 CL 60 - 80% Moderate Assist   15 - 19 CK 40 - 60% Moderate Assist   20 - 22 CJ 20 - 40% Minimal Assist   23 CI 1-20% SBA / CGA   24 CH 0% Independent/ Mod I       Patient left supine with all lines intact and call button in reach    Assessment:  Eros Vital is a 51 y.o. male with a medical diagnosis of Wernicke encephalopathy and presents with Pt noted with some gait instability, but able to perform all ADLs evaluated with set up assist to supervision.  Pt only needed CGA when standing from toilet due to short height of commode.  Pt has 24  Hours supervision available at home and his PLOF was I.  OT does not recommend any DME at discharge as pt has walk in shower with shower bench and grab bars available.  Pt is discharged from acute OT services    Rehab identified problem list/impairments: Rehab identified problem list/impairments: weakness, impaired endurance, gait instability, impaired functional mobilty    Rehab potential is excellent.    Activity tolerance: Good    Discharge recommendations: Discharge " Facility/Level Of Care Needs: home     Barriers to discharge: Barriers to Discharge: None    Equipment recommendations: none     GOALS:   Occupational Therapy Goals     Not on file      Multidisciplinary Problems (Resolved)        Problem: Occupational Therapy Goal    Goal Priority Disciplines Outcome Interventions   Occupational Therapy Goal   (Resolved)     OT, PT/OT Outcome(s) achieved              PLAN:  Patient to be seen   to address the above listed problems via    Plan of Care expires:    Plan of Care reviewed with: patient         Paige WATKINS Ira, OT  02/08/2017

## 2017-02-08 NOTE — PROGRESS NOTES
"[facility] Hospital Medicine  Progress Note    Patient Name: Eros Vital  MRN: [id number]  Patient Class: IP- Inpatient   Admission Date: 2/7/2017  Length of Stay: 1 days  Attending Physician: Madhuri Crandall MD  Primary Care Provider: Paco Wagner MD    Hospital Medicine Team: [facility] HOSP MED 3 Gail Wilkerson MD    Subjective:     Principal Problem:Wernicke encephalopathy    HPI:  Patient is a 50 y/o M with alcohol abuse (approx 0.5 pint vodka /day since 2005), active tobacco use (0.5 pack per day for last 25 years who presents to ER under instruction by BMU unit to go to ER.   10 - days ago, patient suddenly realized that he was excessivly dependent and decided to stop abuse. He started coming to outpatient ariel at [facility].  Last drink 10 days ago. Patient notes alcohol withdrawal symtoms (tremors, inabitily to stand, gait problem) for a few days. eventually the tremor resolved but the ataxia continued to worsen. Patient asked doctor at BMU for eval of ataxia.    For ataxia, he describes feeling weak in his leg muscles bilaterally and feeling "off-balance". He has not had a fall but has been using wheelchair to travel.     Notes chronic loss of peripheral vision and chronic loss of decreased hearing bilaterally.     Patient denies previous significant history of rehab. Longest time period without alcohol since 2005: 10 days (this episode). CAGE positive. H/o seizures in the past requiring treatment prior to 2005. Currently no meds.   He also smokes 0.5 ppd for last 20+ years.       Hospital Course:  Treating for wernicke's encephalopathy by giving thiamine  Will evaluate alternative causes of diarrhea      Interval History: NAEON. VS wnl. Tremors have decreased. Gait still unstable but able to ambulate to the bathroom independently.    Review of Systems   Constitutional: Negative for activity change, chills, diaphoresis and fever.   HENT: Positive for rhinorrhea and sneezing. Negative for " "congestion, sinus pressure, sore throat and trouble swallowing.    Eyes: Negative for visual disturbance.   Respiratory: Negative for cough, shortness of breath and wheezing.    Cardiovascular: Negative for chest pain, palpitations and leg swelling.   Gastrointestinal: Negative for abdominal distention, constipation, diarrhea, nausea and vomiting.   Musculoskeletal: Positive for gait problem. Negative for arthralgias.   Neurological: Positive for tremors. Negative for dizziness, weakness, light-headedness and headaches.        Feels "off-balance"   Psychiatric/Behavioral: Nervous/anxious: mild-moderate.      Objective:     Vital Signs (Most Recent):  Temp: 97.9 °F (36.6 °C) (02/08/17 0750)  Pulse: 89 (02/08/17 0750)  Resp: 14 (02/08/17 0750)  BP: (!) 145/74 (02/08/17 0750)  SpO2: 100 % (02/08/17 0750) Vital Signs (24h Range):  Temp:  [97.9 °F (36.6 °C)-98.7 °F (37.1 °C)] 97.9 °F (36.6 °C)  Pulse:  [] 89  Resp:  [14-17] 14  SpO2:  [95 %-100 %] 100 %  BP: (136-158)/(74-97) 145/74     Weight: 65.8 kg (145 lb)  Body mass index is 19.67 kg/(m^2).    Intake/Output Summary (Last 24 hours) at 02/08/17 1011  Last data filed at 02/08/17 0500   Gross per 24 hour   Intake                0 ml   Output              400 ml   Net             -400 ml      Physical Exam   Constitutional: He is oriented to person, place, and time. He appears well-developed and well-nourished. Distressed: mild.   HENT:   Head: Normocephalic and atraumatic.   Eyes: Conjunctivae are normal. Pupils are equal, round, and reactive to light.   Horizontal nystamus of left eye on lateral eye movements  Limited peripheral vision bilaterally   Neck: No JVD present.   Cardiovascular: Normal rate, regular rhythm, normal heart sounds and intact distal pulses.    No murmur heard.  Pulmonary/Chest: Effort normal and breath sounds normal. No respiratory distress. He has no wheezes.   Abdominal: Soft. Bowel sounds are normal.   Musculoskeletal: He exhibits no " edema.   On gait analysis: had difficulty moving independently. With assistance, left beg not bending at knee andcompensating.    Neurological: He is alert and oriented to person, place, and time.   No cranial nerve abnormalities  Upper extremities - 5/5/ strength bilaterally. No loss of sensation  Lower extremities. 5/5/ strength bilaterally but left foot is lower to respond/follow suit   Skin: He is not diaphoretic.   Vitals reviewed.    Significant Labs:   BMP:   Recent Labs  Lab 02/08/17  0522   GLU 96      K 3.2*      CO2 25   BUN 5*   CREATININE 0.6   CALCIUM 8.6*   MG 1.6     CBC:   Recent Labs  Lab 02/06/17  1538 02/07/17  1547 02/08/17  0522   WBC 6.33 6.80 6.49   HGB 13.6* 13.4* 12.5*   HCT 40.0 40.1 36.6*   * 337 332     Significant Imaging: I have reviewed and interpreted all pertinent imaging results/findings within the past 24 hours.    Findings: MRI is performed with routine sequences without contrast.  There is volume loss which is greater than expected for age involving both supratentorial and infratentorial structures.  No mass, hemorrhage, infarction, subdural collection, hydrocephalus or other acute finding is seen on this cranial MR examination.  No diffusion abnormality is seen suggest an acute infarction.    Assessment/Plan:      * Wernicke encephalopathy  - due to chronic alcohol abuse  - associated with gait ataxia, left eye nystagmus  -Baseline Vitamin B1 level ordered  -Thiamine 500mg IV BID for four doses  -Neuro checks q4    Ataxia  -Concerning for progression of Wernicke's vs. Infectious etiology  -PT/OT ordered  -Fall precautions\  -MRI brain: negative for cerebellar pathologies  -Neurology consulted for advice on further eval/mgmt of gait abnormalitiy    Alcohol abuse  -Last drink ~10 days ago  -Completed valium taper  -CIWAAR qshift  - recommend follow up with addiction psych upon discharge    Tobacco user  - 25 pack year history of 1 pack per day  - not interested  in quitting right now -> plan to sober from alcohol addiction first, then quit tobacco use  - will place patient on nicotine patch while in hospital to prevent withdrawal    Peripheral vision loss  - concern for space occupying lesion/mass acute vs. Chronic causing bilateral loss of peripheral visino  - MRI brain ordered      Macrocytic anemia  - B12 levels elevated, therefore anemia likely due to folate deficiency from poor nutrition  - given patient's initiative to cease alcohol abuse, will advise recommend appropriate nutritional intake upon discharge    Hypomagnesemia  - M.6 today -> replaced with 2g IV   - monitor daily    Transaminitis  - likely due to liver injury from chronic alcohol abuse  - levels recovering  - continue to monitor    VTE Risk Mitigation         Ordered     enoxaparin injection 40 mg  Daily     Route:  Subcutaneous        17 1630     Medium Risk of VTE  Once      17 1630      Diet: regular  Full code    Dispo: will get remaining doses of IV thiamine and discharge home late today vs. Tomorrow morning.     Plan of care discussed with staff    Gail Wilkerson MD  Department of Hospital Medicine   [facility]                    2017                             STAFF PHYSICIAN NOTE                                   Attending Attestation for Rounds with Resident  I have reviewed and concur with the resident's history, physical, assessment, and plan.  I have personally interviewed and examined the patient at bedside and agree with the resident's findings.                                  ________________________________________                                     REASON FOR ADMISSION:     Patient is 51 y.o.male    Body mass index is 19.67 kg/(m^2).,  Wernicke encephalopathy

## 2017-02-08 NOTE — SUBJECTIVE & OBJECTIVE
"Interval History: NAEON. VS wnl. Tremors have decreased. Gait still unstable but able to ambulate to the bathroom independently.    Review of Systems   Constitutional: Negative for activity change, chills, diaphoresis and fever.   HENT: Positive for rhinorrhea and sneezing. Negative for congestion, sinus pressure, sore throat and trouble swallowing.    Eyes: Negative for visual disturbance.   Respiratory: Negative for cough, shortness of breath and wheezing.    Cardiovascular: Negative for chest pain, palpitations and leg swelling.   Gastrointestinal: Negative for abdominal distention, constipation, diarrhea, nausea and vomiting.   Musculoskeletal: Positive for gait problem. Negative for arthralgias.   Neurological: Positive for tremors. Negative for dizziness, weakness, light-headedness and headaches.        Feels "off-balance"   Psychiatric/Behavioral: Nervous/anxious: mild-moderate.      Objective:     Vital Signs (Most Recent):  Temp: 97.9 °F (36.6 °C) (02/08/17 0750)  Pulse: 89 (02/08/17 0750)  Resp: 14 (02/08/17 0750)  BP: (!) 145/74 (02/08/17 0750)  SpO2: 100 % (02/08/17 0750) Vital Signs (24h Range):  Temp:  [97.9 °F (36.6 °C)-98.7 °F (37.1 °C)] 97.9 °F (36.6 °C)  Pulse:  [] 89  Resp:  [14-17] 14  SpO2:  [95 %-100 %] 100 %  BP: (136-158)/(74-97) 145/74     Weight: 65.8 kg (145 lb)  Body mass index is 19.67 kg/(m^2).    Intake/Output Summary (Last 24 hours) at 02/08/17 1011  Last data filed at 02/08/17 0500   Gross per 24 hour   Intake                0 ml   Output              400 ml   Net             -400 ml      Physical Exam   Constitutional: He is oriented to person, place, and time. He appears well-developed and well-nourished. Distressed: mild.   HENT:   Head: Normocephalic and atraumatic.   Eyes: Conjunctivae are normal. Pupils are equal, round, and reactive to light.   Horizontal nystamus of left eye on lateral eye movements  Limited peripheral vision bilaterally   Neck: No JVD present. "   Cardiovascular: Normal rate, regular rhythm, normal heart sounds and intact distal pulses.    No murmur heard.  Pulmonary/Chest: Effort normal and breath sounds normal. No respiratory distress. He has no wheezes.   Abdominal: Soft. Bowel sounds are normal.   Musculoskeletal: He exhibits no edema.   On gait analysis: had difficulty moving independently. With assistance, left beg not bending at knee andcompensating.    Neurological: He is alert and oriented to person, place, and time.   No cranial nerve abnormalities  Upper extremities - 5/5/ strength bilaterally. No loss of sensation  Lower extremities. 5/5/ strength bilaterally but left foot is lower to respond/follow suit   Skin: He is not diaphoretic.   Vitals reviewed.    Significant Labs:   BMP:   Recent Labs  Lab 02/08/17  0522   GLU 96      K 3.2*      CO2 25   BUN 5*   CREATININE 0.6   CALCIUM 8.6*   MG 1.6     CBC:   Recent Labs  Lab 02/06/17  1538 02/07/17  1547 02/08/17  0522   WBC 6.33 6.80 6.49   HGB 13.6* 13.4* 12.5*   HCT 40.0 40.1 36.6*   * 337 332     Significant Imaging: I have reviewed and interpreted all pertinent imaging results/findings within the past 24 hours.

## 2017-02-08 NOTE — ASSESSMENT & PLAN NOTE
- B12 levels elevated, therefore anemia likely due to folate deficiency from poor nutrition  - given patient's initiative to cease alcohol abuse, will advise recommend appropriate nutritional intake upon discharge

## 2017-02-08 NOTE — ASSESSMENT & PLAN NOTE
- 25 pack year history of 1 pack per day  - not interested in quitting right now -> plan to sober from alcohol addiction first, then quit tobacco use  - will place patient on nicotine patch while in hospital to prevent withdrawal

## 2017-02-08 NOTE — SUBJECTIVE & OBJECTIVE
Past Medical History   Diagnosis Date    Alcohol abuse        Past Surgical History   Procedure Laterality Date    Appendectomy         Review of patient's allergies indicates:  No Known Allergies    Current Neurological Medications: IV thiamine     No current facility-administered medications on file prior to encounter.      No current outpatient prescriptions on file prior to encounter.     Family History     None        Social History Main Topics    Smoking status: Current Every Day Smoker     Packs/day: 0.50    Smokeless tobacco: Not on file    Alcohol use No      Comment: 10 days sober    Drug use: No    Sexual activity: Not on file     Review of Systems   Constitutional: Negative for chills and fatigue.   Eyes: Positive for visual disturbance. Negative for pain.   Respiratory: Negative for shortness of breath.    Cardiovascular: Negative for chest pain.   Gastrointestinal: Negative for constipation, diarrhea, nausea and vomiting.   Musculoskeletal: Positive for gait problem. Negative for joint swelling and myalgias.   Neurological: Positive for tremors and seizures. Negative for speech difficulty, weakness, light-headedness, numbness and headaches.     Objective:     Vital Signs (Most Recent):  Temp: 98.9 °F (37.2 °C) (02/08/17 1200)  Pulse: 89 (02/08/17 1200)  Resp: 16 (02/08/17 1200)  BP: 120/67 (02/08/17 1200)  SpO2: 95 % (02/08/17 1200) Vital Signs (24h Range):  Temp:  [97.9 °F (36.6 °C)-98.9 °F (37.2 °C)] 98.9 °F (37.2 °C)  Pulse:  [84-97] 89  Resp:  [14-17] 16  SpO2:  [95 %-100 %] 95 %  BP: (120-150)/(67-97) 120/67     Weight: 65.8 kg (145 lb)  Body mass index is 19.67 kg/(m^2).    Physical Exam   Constitutional: He is oriented to person, place, and time. He appears well-developed and well-nourished.   HENT:   Head: Normocephalic and atraumatic.   Eyes: Pupils are equal, round, and reactive to light.   Neck: Normal range of motion.   Pulmonary/Chest: Effort normal.   Neurological: He is oriented  to person, place, and time. He has an abnormal Heel to Vicente Test, an abnormal Romberg Test and an abnormal Tandem Gait Test. He has a normal Finger-Nose-Finger Test.       NEUROLOGICAL EXAMINATION:     MENTAL STATUS   Oriented to person, place, and time.   Follows 2 step commands.   Attention: normal. Concentration: normal.   Level of consciousness: alert  Knowledge: good.     CRANIAL NERVES     CN III, IV, VI   Pupils are equal, round, and reactive to light.  CN III: no CN III palsy  CN VI: no CN VI palsy  Nystagmus: bilateral   Nystagmus type: horizontal  Diplopia: none    CN V   Facial sensation intact.     CN VII   Facial expression full, symmetric.     CN VIII   CN VIII normal.     CN IX, X   CN IX normal.   Palate: symmetric    CN XI   CN XI normal.   Right sternocleidomastoid strength: normal  Right trapezius strength: normal    CN XII   CN XII normal.   Tongue deviation: none    MOTOR EXAM   Muscle bulk: normal  Overall muscle tone: normal  Right arm pronator drift: absent  Left arm pronator drift: absent    Strength   Strength 5/5 except as noted.   Left strength: Remote left sided clavicular fracture   Left deltoid: 4/5    REFLEXES     Reflexes   Right ankle clonus: absent  Left ankle clonus: absent       Brisk throughout      SENSORY EXAM   Left leg light touch: decreased from knee  Right leg vibration: decreased from knee  Left leg vibration: decreased from knee  Romberg: positive    GAIT AND COORDINATION     Gait  Gait: wide-based     Coordination   Finger to nose coordination: normal  Heel to shin coordination: abnormal  Tandem walking coordination: abnormal       Cautious and wide based gait        Significant Labs:   CMP:   Recent Labs  Lab 02/07/17  1547 02/08/17  0522   GLU 99 96    138   K 3.7 3.2*    105   CO2 29 25   BUN 6 5*   CREATININE 0.7 0.6   CALCIUM 9.4 8.6*   MG 1.3* 1.6   PROT 7.3 6.6   ALBUMIN 3.7 3.3*   BILITOT 0.5 0.5   ALKPHOS 151* 131   AST 70* 66*   * 94*    ANIONGAP 8 8   EGFRNONAA >60.0 >60.0     All pertinent lab results from the past 24 hours have been reviewed.    Significant Imaging: I have reviewed all pertinent imaging results/findings within the past 24 hours.     MRI Brain WO contrast 2/7/17: Age advanced volume loss without acute intracranial pathology.
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
## 2017-02-08 NOTE — ASSESSMENT & PLAN NOTE
- due to chronic alcohol abuse  - associated with gait ataxia, left eye nystagmus  -Baseline Vitamin B1 level ordered  -Thiamine 500mg IV BID for four doses  -Neuro checks q4

## 2017-02-08 NOTE — ASSESSMENT & PLAN NOTE
-Last drink ~10 days ago  -Completed valium taper  -YAZMIN qshift  - recommend follow up with addiction psych upon discharge

## 2017-02-08 NOTE — PROGRESS NOTES
Patient Consulted by CTTS:     The following was discussed by the Tobacco Treatment Specialist:  ? Relevance of Quitting  ? Risk to Health  ? Long Term Risk  ? Risk for Others  ? Rewards of Quitting  ? Motivation Intervention to Quit        Information given to patient concerning the [facility] smoking cessation clinic.

## 2017-02-08 NOTE — CONSULTS
Please see consult note dated 2/8/17.    Pearl Soriano PA-C  General Neurology Consult  Neuro Consult [phone]

## 2017-02-08 NOTE — PT/OT/SLP EVAL
Physical Therapy  Evaluation    Eros Vital   MRN: [id number]   Admitting Diagnosis: Wernicke encephalopathy    PT Received On: 17  PT Start Time: 820     PT Stop Time: 08    PT Total Time (min): 19 min       Billable Minutes:  Evaluation 19    Diagnosis: Wernicke encephalopathy      Past Medical History   Diagnosis Date    Alcohol abuse       Past Surgical History   Procedure Laterality Date    Appendectomy         Referring physician:  ROLAND Crandall  Date referred to PT: 2017    General Precautions: Standard, fall  Orthopedic Precautions: N/A   Braces: N/A            Patient History:  Lives With: spouse  Living Arrangements: house  Home Accessibility: stairs within home  Home Layout: Able to live on 1st floor  Living Environment Comment: Pt lives with wife in 2-story house with bed and bath on 1st floor. Pt reports (I) with ADLs and amb. 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   Equipment Currently Used at Home: cane, straight  DME owned (not currently used): single point cane    Previous Level of Function:  Ambulation Skills: independent  Transfer Skills: independent  ADL Skills: independent    Subjective:  Communicated with RN prior to session.  Pt agreeable to therapy session.   Chief Complaint: gait instability   Patient goals: return home     Pain Ratin/10               Pain Rating Post-Intervention: 0/10    Objective:   Patient found with: peripheral IV     Cognitive Exam:  Oriented to: Person, Place, Time and Situation    Follows Commands/attention: Follows multistep  commands  Communication: clear/fluent  Safety awareness/insight to disability: intact    Physical Exam:  Postural examination/scapula alignment: Rounded shoulder    Skin integrity: Visible skin intact  Edema: None noted B LE    Sensation:   Impaired  light/touch L ant tib (pt reports possibly at baseline)    Lower Extremity Range of Motion:  Right Lower Extremity: WFL  Left Lower Extremity: WFL    Lower  Extremity Strength:  Right Lower Extremity: WFL  Left Lower Extremity: WFL     Gross motor coordination: WFL    Functional Mobility:  Bed Mobility:  Supine to Sit: Modified Independent  Sit to Supine: Modified Independent    Transfers:  Sit <> Stand Assistance: Supervision  Sit <> Stand Assistive Device: No Assistive Device  Toilet Transfer Technique: Stand Pivot  Toilet Transfer Assistance: Supervision  Toilet Transfer Assistive Device: No Assistive Device    Gait:   Gait Distance: ~150ft slightly unsteady ataxic gait  Assistance 1: Stand by Assistance  Gait Assistive Device: No device  Gait Pattern: swing-through gait    Balance:   Static Sit: NORMAL: No deviations seen in posture held statically  Dynamic Sit: NORMAL: No deviations seen in posture held dynamically  Static Stand: GOOD-: Takes MODERATE challenges from all directions inconsistently  Dynamic stand: FAIR+: Needs CLOSE SUPERVISION during gait and is able to right self with minor LOB    Therapeutic Activities and Exercises:  Pt educated on role of PT/POC.  Pt safe to amb in hallway with RN staff.     AM-PAC 6 CLICK MOBILITY  How much help from another person does this patient currently need?   1 = Unable, Total/Dependent Assistance  2 = A lot, Maximum/Moderate Assistance  3 = A little, Minimum/Contact Guard/Supervision  4 = None, Modified [place]/Independent    Turning over in bed (including adjusting bedclothes, sheets and blankets)?: 4  Sitting down on and standing up from a chair with arms (e.g., wheelchair, bedside commode, etc.): 3  Moving from lying on back to sitting on the side of the bed?: 3  Moving to and from a bed to a chair (including a wheelchair)?: 3  Need to walk in hospital room?: 3  Climbing 3-5 steps with a railing?: 3  Total Score: 19     AM-PAC Raw Score CMS G-Code Modifier Level of Impairment Assistance   6 % Total / Unable   7 - 9 CM 80 - 100% Maximal Assist   10 - 14 CL 60 - 80% Moderate Assist   15 - 19 CK 40 - 60%  Moderate Assist   20 - 22 CJ 20 - 40% Minimal Assist   23 CI 1-20% SBA / CGA   24 CH 0% Independent/ Mod I     Patient left supine with all lines intact, call button in reach and RN notified.    Assessment:   Eros Vital is a 51 y.o. male with a medical diagnosis of Wernicke encephalopathy and presents with gait instability and decreased overall functional mobility. Pt performed bed mobility mod (I) and transfers S. Pt amb  ~150ft slightly unsteady ataxic gait SBA without AD; no LOB or SOB. Pt will benefit from skilled PT to improve deficits and increase overall functional mobility.     Rehab identified problem list/impairments: Rehab identified problem list/impairments: gait instability, impaired functional mobilty    Rehab potential is good.    Activity tolerance: Good    Discharge recommendations: Discharge Facility/Level Of Care Needs: outpatient PT     Barriers to discharge: Barriers to Discharge: None    Equipment recommendations: Equipment Needed After Discharge: none     GOALS:   Physical Therapy Goals        Problem: Physical Therapy Goal    Goal Priority Disciplines Outcome Goal Variances Interventions   Physical Therapy Goal     PT/OT, PT Ongoing (interventions implemented as appropriate)     Description:  Goals to be met by: 2017     Patient will increase functional independence with mobility by performin. Sit to stand transfer with Modified Walker  2. Gait  x 200 feet with Supervision without AD.                 PLAN:    Patient to be seen 2 x/week to address the above listed problems via gait training, therapeutic activities, therapeutic exercises  Plan of Care expires: 17  Plan of Care reviewed with: patient          EMIR WHYTE, PT  2017

## 2017-02-08 NOTE — ASSESSMENT & PLAN NOTE
-Vitamin B1 level pending from 2/7/17  Empiric IV thiamine replacement started yesterday  -MRI Brain with volume loss and no acute intracranial pathology  -Neuro exam remarkable for cautious, wide based gait (not ataxia) likely 2/2 combination of proprioceptive issues from peripheral neuropathy (diminished vibratory sense) and chronic alcohol abuse.   -Continue thiamine replacement and follow-up with outpatient Neurology in 2 months for further monitoring of gait

## 2017-02-08 NOTE — SUBJECTIVE & OBJECTIVE
"Past Medical History   Diagnosis Date    Alcohol abuse      Past Surgical History   Procedure Laterality Date    Appendectomy       Review of patient's allergies indicates:  No Known Allergies    No current facility-administered medications on file prior to encounter.      Current Outpatient Prescriptions on File Prior to Encounter   Medication Sig    [DISCONTINUED] diazePAM (VALIUM) 5 MG tablet Take four pills on day one. Take three pills on day two. On the last two days, take one pill daily.    [DISCONTINUED] thiamine (B-1) 100 mg/mL injection Inject 2.5 mLs (250 mg total) into the muscle once daily.     Family History     None        Social History Main Topics    Smoking status: Current Every Day Smoker     Packs/day: 0.50    Smokeless tobacco: Not on file    Alcohol use No      Comment: 10 days sober    Drug use: No    Sexual activity: Not on file     Review of Systems   Constitutional: Negative for activity change, chills, diaphoresis and fever.   HENT: Positive for rhinorrhea and sneezing. Negative for congestion, sinus pressure, sore throat and trouble swallowing.    Eyes: Negative for visual disturbance.   Respiratory: Negative for cough, shortness of breath and wheezing.    Cardiovascular: Negative for chest pain, palpitations and leg swelling.   Gastrointestinal: Negative for abdominal distention, constipation, diarrhea, nausea and vomiting.   Musculoskeletal: Positive for gait problem. Negative for arthralgias.   Neurological: Positive for tremors. Negative for dizziness, weakness, light-headedness and headaches.        Feels "off-balance"   Psychiatric/Behavioral: Nervous/anxious: mild-moderate.      Objective:     Vital Signs (Most Recent):  Temp: 98.2 °F (36.8 °C) (02/07/17 1402)  Pulse: 84 (02/07/17 1623)  Resp: 16 (02/07/17 1402)  BP: (!) 150/80 (02/07/17 1623)  SpO2: 97 % (02/07/17 1623) Vital Signs (24h Range):  Temp:  [98.2 °F (36.8 °C)] 98.2 °F (36.8 °C)  Pulse:  [] 84  Resp:  [16] " 16  SpO2:  [97 %-98 %] 97 %  BP: (150-158)/(80-85) 150/80     Weight: 65.8 kg (145 lb)  Body mass index is 19.67 kg/(m^2).    Physical Exam   Constitutional: He is oriented to person, place, and time. He appears well-developed and well-nourished. Distressed: mild.   HENT:   Head: Normocephalic and atraumatic.   Eyes: Conjunctivae are normal. Pupils are equal, round, and reactive to light.   Horizontal nystamus of left eye on lateral eye movements  Limited peripheral vision bilaterally   Neck: No JVD present.   Cardiovascular: Normal rate, regular rhythm, normal heart sounds and intact distal pulses.    No murmur heard.  Pulmonary/Chest: Effort normal and breath sounds normal. No respiratory distress. He has no wheezes.   Abdominal: Soft. Bowel sounds are normal.   Musculoskeletal: He exhibits no edema.   On gait analysis: had difficulty moving independently. With assistance, left beg not bending at knee andcompensating.    Neurological: He is alert and oriented to person, place, and time.   No cranial nerve abnormalities  Upper extremities - 5/5/ strength bilaterally. No loss of sensation  Lower extremities. 5/5/ strength bilaterally but left foot is lower to respond/follow suit   Skin: He is not diaphoretic.   Vitals reviewed.     Significant Labs:   BMP:   Recent Labs  Lab 02/07/17  1547   GLU 99      K 3.7      CO2 29   BUN 6   CREATININE 0.7   CALCIUM 9.4   MG 1.3*     CBC:   Recent Labs  Lab 02/06/17  1538 02/07/17  1547   WBC 6.33 6.80   HGB 13.6* 13.4*   HCT 40.0 40.1   * 337     Significant Imaging: I have reviewed and interpreted all pertinent imaging results/findings within the past 24 hours.     MRI brain: pending

## 2017-02-08 NOTE — ASSESSMENT & PLAN NOTE
- concern for space occupying lesion/mass acute vs. Chronic causing bilateral loss of peripheral visino  - MRI brain noted diffuse cerebral atrophy but no

## 2017-02-08 NOTE — ASSESSMENT & PLAN NOTE
-Concerning for progression of Wernicke's vs. Infectious etiology  -PT/OT ordered  -Fall precautions

## 2017-02-08 NOTE — MEDICAL/APP STUDENT
Progress Note  [facility]    Patient Name: Eros Vital  YOB: 1965    Admit Date: 2/7/2017                     LOS: 1    SUBJECTIVE:     Reason for Admission:  Wernicke encephalopathy  See H&P for detailed presentating history and ROS.      Interval history: NAEON. Patient was sleeping. Room is overwhelming with a strong smell (urine/rubber boots). Patient seems unaware of smell. Patient says he is feeling better, just tired because he wasn't able to sleep well due to problems with his IV.      OBJECTIVE:     Vital Signs Range (Last 24H):  Temp:  [97.9 °F (36.6 °C)-98.7 °F (37.1 °C)]   Pulse:  []   Resp:  [14-17]   BP: (136-158)/(74-97)   SpO2:  [95 %-100 %] Body mass index is 19.67 kg/(m^2).  Wt Readings from Last 1 Encounters:   02/07/17 1402 65.8 kg (145 lb)       I & O (Last 24H):  Intake/Output Summary (Last 24 hours) at 02/08/17 0918  Last data filed at 02/08/17 0500   Gross per 24 hour   Intake                0 ml   Output              400 ml   Net             -400 ml       Physical Exam:  General: no distress, AOx3  Eyes: conjunctivae/corneas clear. PERRL., positive findings: nystagmus more prominent in the left eye   Lungs: clear to auscultation bilaterally and normal respiratory effort  Cardiovascular: Heart: regular rate and rhythm, S1, S2 normal, no murmur, click, rub or gallop  Neurological: High frequency, low amplitude, tremor with intention. Power 5/5 in both upper and lower extremities. Reflexes: 2+ bilaterally in lower extremities. 2+ right upper extremity. 1+ left upper extremity.   Gait: Still abnormal but balance and stability look improved. Patient looks more confident about his steps and not seeking physical support as much.     Diagnostic Results:  Lab Results   Component Value Date    WBC 6.49 02/08/2017    HGB 12.5 (L) 02/08/2017    HCT 36.6 (L) 02/08/2017     (H) 02/08/2017     02/08/2017       Recent Labs  Lab 02/08/17  0522   GLU 96      K  3.2*      CO2 25   BUN 5*   CREATININE 0.6   CALCIUM 8.6* Corrected to 9.2   MG 1.6     MRI of brain   Volume loss greater than expected for age in the supratentorial and infratentorial regions. No masses, hemorrhages, infarction, or hydrocephalus seen.    ASSESSMENT/PLAN:     1. Wernicke encephalopathy  - 500 mg thiamine IV infused over 30 min three times daily for 2 consecutive days (UpToDate)  - 250 mg thiamine IV/IM once daily for an additional five days (UpToDate)  - Daily oral 100 mg of thiamine should be continued until patients are no longer considered at risk (UpToDate)  - Waiting for thiamine levels to return    2. Ataxia  - follow up with Neurology   - follow up with PT      Active Hospital Problems    Diagnosis  POA    *Wernicke encephalopathy [E51.2]  Yes    Macrocytic anemia [D53.9]  Yes    Ataxia [R27.0]  Yes    Transaminitis [R74.0]  Yes    Tobacco user [Z72.0]  Yes    Hypomagnesemia [E83.42]  Yes    Peripheral vision loss [H53.459]  Yes    Alcohol abuse [F10.10]  Yes      Resolved Hospital Problems    Diagnosis Date Resolved POA   No resolved problems to display.         Signing Physician:  Emma Bowden

## 2017-02-08 NOTE — PLAN OF CARE
02/08/17 1523   Discharge Assessment   Assessment Type Discharge Planning Assessment   Confirmed/corrected address and phone number on facesheet? Yes   Assessment information obtained from? Patient;Medical Record   Expected Length of Stay (days) 3   Communicated expected length of stay with patient/caregiver yes   Prior to hospitilization cognitive status: Alert/Oriented   Prior to hospitalization functional status: Independent   Current cognitive status: Alert/Oriented   Current Functional Status: Independent   Arrived From home or self-care   Lives With spouse   Able to Return to Prior Arrangements yes   Is patient able to care for self after discharge? Yes   How many people do you have in your home that can help with your care after discharge? 1   Patient's perception of discharge disposition home or selfcare   Readmission Within The Last 30 Days no previous admission in last 30 days   Patient currently being followed by outpatient case management? No   Patient currently receives home health services? No   Does the patient currently use HME? No   Patient currently receives private duty nursing? No   Patient currently receives any other outside agency services? No   Equipment Currently Used at Home none   Do you have any problems affording any of your prescribed medications? No   Is the patient taking medications as prescribed? yes   Do you have any financial concerns preventing you from receiving the healthcare you need? No   Does the patient have transportation to healthcare appointments? Yes   Transportation Available car   On Dialysis? No   Does the patient receive services at the Coumadin Clinic? No   Are there any open cases? No   Discharge Plan A Home with family   Patient/Family In Agreement With Plan yes

## 2017-02-08 NOTE — H&P
"[facility] Hospital Medicine  History & Physical    Patient Name: Eros Vital  MRN: [id number]  Admission Date: 2/7/2017  Attending Physician: Madhuri Crandall MD   Primary Care Provider: Paco Wagner MD    [place] [facility] Medicine Team: [facility] MED 3 Gail Wilkerson MD     Patient information was obtained from patient.     Subjective:     Principal Problem:Wernicke encephalopathy    Chief Complaint:   Chief Complaint   Patient presents with    Multiple Complaints     loss of peripheral vision, unsteady gait (both symptoms persistent x weeks states pt)/ states was told to go to ER from Group Therapy/ recovering alcoholic (states pt)        HPI: Patient is a 50 y/o M with alcohol abuse (approx 0.5 pint vodka /day since 2005), active tobacco use (0.5 pack per day for last 25 years who presents to ER under instruction by BMU unit to go to ER.   10 - days ago, patient suddenly realized that he was excessivly dependent and decided to stop abuse. He started coming to outpatient ariel at [facility].  Last drink 10 days ago. Patient notes alcohol withdrawal symtoms (tremors, inabitily to stand, gait problem) for a few days. eventually the tremor resolved but the ataxia continued to worsen. Patient asked doctor at BMU for eval of ataxia.    For ataxia, he describes feeling weak in his leg muscles bilaterally and feeling "off-balance". He has not had a falll but has been using wheelchair to travel.     Notes chronic loss of peripheral vision and chronic loss of decreased hearing bilaterally.     Patient denies previous significant history of rehab. Longest time period without alcohol since 2005: 10 days (this episode). CAGE positive. H/o seizures in the past requiring treatment prior to 2005. Currently no meds.   He also smokes 0.5 ppd for last 20+ years.       Past Medical History   Diagnosis Date    Alcohol abuse      Past Surgical History   Procedure Laterality Date    Appendectomy       Review of patient's " "allergies indicates:  No Known Allergies    No current facility-administered medications on file prior to encounter.      Current Outpatient Prescriptions on File Prior to Encounter   Medication Sig    [DISCONTINUED] diazePAM (VALIUM) 5 MG tablet Take four pills on day one. Take three pills on day two. On the last two days, take one pill daily.    [DISCONTINUED] thiamine (B-1) 100 mg/mL injection Inject 2.5 mLs (250 mg total) into the muscle once daily.     Family History     None        Social History Main Topics    Smoking status: Current Every Day Smoker     Packs/day: 0.50    Smokeless tobacco: Not on file    Alcohol use No      Comment: 10 days sober    Drug use: No    Sexual activity: Not on file     Review of Systems   Constitutional: Negative for activity change, chills, diaphoresis and fever.   HENT: Positive for rhinorrhea and sneezing. Negative for congestion, sinus pressure, sore throat and trouble swallowing.    Eyes: Negative for visual disturbance.   Respiratory: Negative for cough, shortness of breath and wheezing.    Cardiovascular: Negative for chest pain, palpitations and leg swelling.   Gastrointestinal: Negative for abdominal distention, constipation, diarrhea, nausea and vomiting.   Musculoskeletal: Positive for gait problem. Negative for arthralgias.   Neurological: Positive for tremors. Negative for dizziness, weakness, light-headedness and headaches.        Feels "off-balance"   Psychiatric/Behavioral: Nervous/anxious: mild-moderate.      Objective:     Vital Signs (Most Recent):  Temp: 98.2 °F (36.8 °C) (02/07/17 1402)  Pulse: 84 (02/07/17 1623)  Resp: 16 (02/07/17 1402)  BP: (!) 150/80 (02/07/17 1623)  SpO2: 97 % (02/07/17 1623) Vital Signs (24h Range):  Temp:  [98.2 °F (36.8 °C)] 98.2 °F (36.8 °C)  Pulse:  [] 84  Resp:  [16] 16  SpO2:  [97 %-98 %] 97 %  BP: (150-158)/(80-85) 150/80     Weight: 65.8 kg (145 lb)  Body mass index is 19.67 kg/(m^2).    Physical Exam "   Constitutional: He is oriented to person, place, and time. He appears well-developed and well-nourished. Distressed: mild.   HENT:   Head: Normocephalic and atraumatic.   Eyes: Conjunctivae are normal. Pupils are equal, round, and reactive to light.   Horizontal nystamus of left eye on lateral eye movements  Limited peripheral vision bilaterally   Neck: No JVD present.   Cardiovascular: Normal rate, regular rhythm, normal heart sounds and intact distal pulses.    No murmur heard.  Pulmonary/Chest: Effort normal and breath sounds normal. No respiratory distress. He has no wheezes.   Abdominal: Soft. Bowel sounds are normal.   Musculoskeletal: He exhibits no edema.   On gait analysis: had difficulty moving independently. With assistance, left beg not bending at knee andcompensating.    Neurological: He is alert and oriented to person, place, and time.   No cranial nerve abnormalities  Upper extremities - 5/5/ strength bilaterally. No loss of sensation  Lower extremities. 5/5/ strength bilaterally but left foot is lower to respond/follow suit   Skin: He is not diaphoretic.   Vitals reviewed.     Significant Labs:   BMP:   Recent Labs  Lab 02/07/17  1547   GLU 99      K 3.7      CO2 29   BUN 6   CREATININE 0.7   CALCIUM 9.4   MG 1.3*     CBC:   Recent Labs  Lab 02/06/17  1538 02/07/17  1547   WBC 6.33 6.80   HGB 13.6* 13.4*   HCT 40.0 40.1   * 337     Significant Imaging: I have reviewed and interpreted all pertinent imaging results/findings within the past 24 hours.     MRI brain: pending    Assessment/Plan:     * Wernicke encephalopathy  - due to chronic alcohol abuse  - associated with gait ataxia, left eye nystagmus  -Baseline Vitamin B1 level ordered  -Thiamine 500mg IV BID for four doses  -Neuro checks q4    Ataxia  -Concerning for progression of Wernicke's vs. Infectious etiology  -PT/OT ordered  -Fall precautions    Peripheral vision loss  - concern for space occupying lesion/mass acute vs.  Chronic causing bilateral loss of peripheral visino  - MRI brain ordered    Alcohol abuse  -Last drink ~10 days ago (1/27/17) before he checked himself into [facility] rehab  -Completed valium taper per BMU team  -YAZMIN lr    Tobacco user  - 25 pack year history of 1 pack per day  - not interested in quitting right now -> plan to sober from alcohol addiction first, then quit tobacco use  - will place patient on nicotine patch while in hospital to prevent withdrawal    Hypomagnesemia  - replaced with 4mg IV mag  - monitor daily      Transaminitis  - likely due to liver injury from chronic alcohol abuse  - levels recovering  - continue to monitor    VTE Risk Mitigation         Ordered     enoxaparin injection 40 mg  Daily     Route:  Subcutaneous        02/07/17 1630     Medium Risk of VTE  Once      02/07/17 1630      Diet: adult regular   Full code    Dispo; Ataxia may be component of wernicke' s (b1 def). Will replace vitamin while r/o other organic causes.     Plan of care to be discussed with staff in     Gail Wilkerson MD  Department of Hospital Medicine   [facility]                      02/08/2017                             STAFF PHYSICIAN NOTE                                   Attending Attestation for Rounds with Resident  I have reviewed and concur with the resident's history, physical, assessment, and plan.  I have personally interviewed and examined the patient at bedside and agree with the resident's findings.                                  ________________________________________                                     REASON FOR ADMISSION:     Patient is 51 y.o.male    Body mass index is 19.67 kg/(m^2).,  Wernicke encephalopathy

## 2017-02-08 NOTE — PLAN OF CARE
Problem: Occupational Therapy Goal  Goal: Occupational Therapy Goal  Outcome: Outcome(s) achieved Date Met:  02/08/17  Pt was seen by OT for evaluation.  Pt noted with some gait instability, but able to perform all ADLs evaluated with set up assist to supervision.  Pt only needed CGA when standing from toilet due to short height of commode.  Pt has 24  Hours supervision available at home and his PLOF was I.  OT does not recommend any DME at discharge as pt has walk in shower with shower bench and grab bars available.  Pt is discharged from acute OT services        Paige Roth OT  2/8/2017

## 2017-02-08 NOTE — CONSULTS
"2/8/2017 3:08 PM  Eros Vital  1965  [id number]    Addiction Psychiatry Initial Consultation    Consult Requested By: Madhuri Crandall MD    Reason for Consult: alcohol use disorder    SUBJECTIVE:     Chief Complaint/Reason for Admission: Wernicke's encephalopathy    History of Present Illness:  Patient is a 50 y/o M with alcohol abuse (approx 0.5 pint vodka /day since 2005), active tobacco use (0.5 pack per day for last 25 years who presents to ER under instruction by BMU unit to go to ER.  10 - days ago, patient suddenly realized that he was excessivly dependent and decided to stop abuse. [facility]  Last drink 10 days ago.  Patient notes alcohol withdrawal symtoms (tremors, inabitily to stand, gait problem) for a few days. eventually the tremor resolved but the ataxia continued to worsen.  Patient asked doctor at U for eval of ataxia.  Addiction psychiatry is following patient for pt's alcohol use disorder.    Per chart review:  On initial admit to the [facility]ive Behavior Unit ([facility]) for severe alcohol use disorder, pt demonstrated s/s of withdrawals including tremors, tongue fasciculations and gait instability.  Pt was placed on a Valium taper and completed it 2/5.  Despite resolution of his tremor, his ataxia persisted.  On MOCA testing, pt demonstrated impaired cognition (26/30).  On neuro exam, pt exhibited bilateral nystagmus and loss of peripheral vision.  Pt was subsequently discharged from the ABU yesterday and transferred to the ED for evaluation of possible Wernicke's encephalopathy.     Today, pt is seen at bedside.  He reports feeling "better."  He is eating heartily.  He did not sleep well b/c he had to lie flat on his back to accommodate the IV.  He usually sleeps on his side and thus could not get comfortable all night.  He reports a little improvement in his gait as he was able to walk unassisted down the hallway with PT.  He recounts an amusing incident " about a nurse that came in offering him pain medicine erroneously when he called for assistance with the IV pump.  He reports he was not tempted to accept the proferred medications despite his substance use history.  Denies any cravings.  He would like to return to the program when he is discharged from the hospital.  He has been advised to contact the unit for re-admit schedule.      Substance Abuse History:  Substance of Choice: alcohol  Substances Used: experimented as a youth  History of IVDU?:  denied  Use of Alcohol: Heavy, see HPI  Tobacco: Yes 0.5 PPD  History of Withdrawals: Yes (active)  History of Detox: denied  Rehab History:  denied     Alcohol Use Disorder Criteria:  A. A problematic pattern of substance use leading to clinically significant impairment or distress, as manifested by at least two of the following, occuring within a 12-month period:     1. Substance is often taken in larger amounts or over a longer period than was intended.   2. There is a persistent desire or unsuccessful efforts to cut down or control substance use.   3. A great deal of time in spent in activities necessary to obtain substance, use substance, or recover from its effects.  4. Craving, or a strong desire or urge to use substance.  5. Recurrent substance use resulting in a failure to fulfill major obligations at work, school, or home.  6. Continued substance use despite having persistent or recurrent social or interpersonal problems caused or exacerbated by the effects of substance.  7. Important social, occupational, or recreational activities are given up or reduced because of substance use.  8. Recurrent substance us in situations in which it is physically hazardous.  9. Substance use is continued despite knowledge of having a persistent or recurrent physical or psychological problems that is likely to have been caused or exacerbated by substance.  10. Tolerance, as defined by either of the following:  A. A need for  markedly increased amounts of substance to achieve intoxication or desired effect.   B. A markedly diminished effect with continued use of the same amount of substance.  11. Withdrawal, as manifested by the following:  A. The characteristic withdrawal syndrome for substance  B. Substance is taken to relieve or avoid withdrawal symptoms.    COMORBIDITIES:  Past Psychiatric History: yes  Diagnoses: PTSD  Previous Medication Trials: denied  Previous Psychiatric Hospitalizations: denied  Previous Suicide Attempts: denied  History of Violence: denied  Outpatient Psychiatrist: denied (saw one once for PTSD after Tiffany)    Does patient have an Advance Directive for Mental Health Treatment? No    Medical History:  Past Medical History   Diagnosis Date    Alcohol abuse      Surgical History:  Past Surgical History   Procedure Laterality Date    Appendectomy       Allergies:  Review of patient's allergies indicates:  No Known Allergies    Patient aware of biomedical complications? 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 [place]   history: denied  Legal:   Past Charges/Incarcerations: denied   Pending charges: denied   Financial status: adequate      Psychosocial Factors:  Maladaptive or problem behaviors: alcohol use   Peer group, social, ethic, cultural, emotional, and health factors:   Living situation, family constellation, family circumstances/home: with wife  Recovery environment: good  Community resources used by patient: denied  Treatment acceptance/motivation for change: yes      OBJECTIVE:     Vital  Signs (Most Recent)  Vitals:    02/08/17 1200   BP: 120/67   Pulse: 89   Resp: 16   Temp: 98.9 °F (37.2 °C)     Mental Status Exam:  Appearance: older than stated age, malnourished, dressed in hospital gown  Behavior/Cooperation: normal, cooperative  Speech: normal tone, normal rate, normal pitch, normal volume  Mood: neutral  Affect: normal  Thought Process: normal and logical  Thought Content: normal, no suicidality, no homicidality, delusions, or paranoia  Orientation: grossly intact  Memory: Grossly intact  Attention Span/Concentration: Normal  Insight: fair  Judgment: fair    Labs/Imaging/Studies:   Recent Results (from the past 24 hour(s))   CBC auto differential    Collection Time: 02/07/17  3:47 PM   Result Value Ref Range    WBC 6.80 3.90 - 12.70 K/uL    RBC 3.88 (L) 4.60 - 6.20 M/uL    Hemoglobin 13.4 (L) 14.0 - 18.0 g/dL    Hematocrit 40.1 40.0 - 54.0 %     (H) 82 - 98 fL    MCH 34.5 (H) 27.0 - 31.0 pg    MCHC 33.4 32.0 - 36.0 %    RDW 12.2 11.5 - 14.5 %    Platelets 337 150 - 350 K/uL    MPV 9.5 9.2 - 12.9 fL    Gran # 4.2 1.8 - 7.7 K/uL    Lymph # 0.9 (L) 1.0 - 4.8 K/uL    Mono # 1.5 (H) 0.3 - 1.0 K/uL    Eos # 0.2 0.0 - 0.5 K/uL    Baso # 0.08 0.00 - 0.20 K/uL    Gran% 61.5 38.0 - 73.0 %    Lymph% 12.6 (L) 18.0 - 48.0 %    Mono% 21.8 (H) 4.0 - 15.0 %    Eosinophil% 2.6 0.0 - 8.0 %    Basophil% 1.2 0.0 - 1.9 %    Differential Method Automated    Comprehensive metabolic panel    Collection Time: 02/07/17  3:47 PM   Result Value Ref Range    Sodium 141 136 - 145 mmol/L    Potassium 3.7 3.5 - 5.1 mmol/L    Chloride 104 95 - 110 mmol/L    CO2 29 23 - 29 mmol/L    Glucose 99 70 - 110 mg/dL    BUN, Bld 6 6 - 20 mg/dL    Creatinine 0.7 0.5 - 1.4 mg/dL    Calcium 9.4 8.7 - 10.5 mg/dL    Total Protein 7.3 6.0 - 8.4 g/dL    Albumin 3.7 3.5 - 5.2 g/dL    Total Bilirubin 0.5 0.1 - 1.0 mg/dL    Alkaline Phosphatase 151 (H) 55 - 135 U/L    AST 70 (H) 10 - 40 U/L     (H) 10 - 44 U/L    Anion Gap 8 8 - 16  mmol/L    eGFR if African American >60.0 >60 mL/min/1.73 m^2    eGFR if non African American >60.0 >60 mL/min/1.73 m^2   Magnesium    Collection Time: 02/07/17  3:47 PM   Result Value Ref Range    Magnesium 1.3 (L) 1.6 - 2.6 mg/dL   Vitamin B12    Collection Time: 02/07/17  3:47 PM   Result Value Ref Range    Vitamin B-12 973 (H) 210 - 950 pg/mL   CBC with Automated Differential    Collection Time: 02/08/17  5:22 AM   Result Value Ref Range    WBC 6.49 3.90 - 12.70 K/uL    RBC 3.60 (L) 4.60 - 6.20 M/uL    Hemoglobin 12.5 (L) 14.0 - 18.0 g/dL    Hematocrit 36.6 (L) 40.0 - 54.0 %     (H) 82 - 98 fL    MCH 34.7 (H) 27.0 - 31.0 pg    MCHC 34.2 32.0 - 36.0 %    RDW 12.0 11.5 - 14.5 %    Platelets 332 150 - 350 K/uL    MPV 9.5 9.2 - 12.9 fL    Gran # 3.7 1.8 - 7.7 K/uL    Lymph # 1.1 1.0 - 4.8 K/uL    Mono # 1.4 (H) 0.3 - 1.0 K/uL    Eos # 0.3 0.0 - 0.5 K/uL    Baso # 0.09 0.00 - 0.20 K/uL    Gran% 56.9 38.0 - 73.0 %    Lymph% 16.2 (L) 18.0 - 48.0 %    Mono% 21.1 (H) 4.0 - 15.0 %    Eosinophil% 3.9 0.0 - 8.0 %    Basophil% 1.4 0.0 - 1.9 %    Differential Method Automated    Comprehensive Metabolic Panel (CMP)    Collection Time: 02/08/17  5:22 AM   Result Value Ref Range    Sodium 138 136 - 145 mmol/L    Potassium 3.2 (L) 3.5 - 5.1 mmol/L    Chloride 105 95 - 110 mmol/L    CO2 25 23 - 29 mmol/L    Glucose 96 70 - 110 mg/dL    BUN, Bld 5 (L) 6 - 20 mg/dL    Creatinine 0.6 0.5 - 1.4 mg/dL    Calcium 8.6 (L) 8.7 - 10.5 mg/dL    Total Protein 6.6 6.0 - 8.4 g/dL    Albumin 3.3 (L) 3.5 - 5.2 g/dL    Total Bilirubin 0.5 0.1 - 1.0 mg/dL    Alkaline Phosphatase 131 55 - 135 U/L    AST 66 (H) 10 - 40 U/L    ALT 94 (H) 10 - 44 U/L    Anion Gap 8 8 - 16 mmol/L    eGFR if African American >60.0 >60 mL/min/1.73 m^2    eGFR if non African American >60.0 >60 mL/min/1.73 m^2   Magnesium    Collection Time: 02/08/17  5:22 AM   Result Value Ref Range    Magnesium 1.6 1.6 - 2.6 mg/dL   Phosphorus    Collection Time: 02/08/17  5:22 AM    Result Value Ref Range    Phosphorus 3.2 2.7 - 4.5 mg/dL        ASSESSMENT/PLAN:   Diagnosis:  Axis I: Alcohol Use Disorder, severe  PTSD   Axis II: NO DIAGNOSIS ON AXIS II (Z03.89)    Axis III: Past Medical History   Diagnosis Date    Alcohol abuse    , Ataxia [R27.0]  Wernicke encephalopathy [E51.2]  Alcohol use disorder, severe, dependence [F10.20]   Axis IV: Good recovery capital   Axis V: 71-80 If symptoms are present, they are transient and expectable reactions to psychosocial stressors (e.g. difficulty concentrating after family argument); no more than slight impairment in social, occupational or school functioning (e.g. temporarily falling behind in schoolwork)       Plan:  - Pt appears to be improving on IV thiamine.  Pt self-reports improved gait stability.  Nystagmus still evident on exam.    - MRI brain 2/7:  Volume loss which is increased for pt's age.  No focal lesions.  - Agree with PT/OT evaluation and treatment.  - Agree with Neurology consult.  - Continue with IV thiamine and IV Mg repletion per primary team.    - On discharge, if pt is physically and medically stable, pt may return to ABU program for substance rehabilitation.  Pt will need to call [phone], option 2 to schedule re-admit.      Pt seen with Dr. Stevens.     Jorge Luis Orta MD  [facility]  PGY-2  Pager:  [phone]      Attending Attestation:    I have independently evaluated the patient and discussed the case with the resident. I have reviewed this note and agree with its contents, as well as the assessment and plan.     Kaity Stevens MD

## 2017-02-08 NOTE — H&P
HISTORY & PHYSICAL  Hospital Medicine    Team: [facility] HOSP MED 3    PRESENTING HISTORY     Chief Complaint/Reason for Admission: Wernicke's Encephalopathy     History of Present Illness:  Mr. Eros Vital is a 51 y.o. male with chronic alcohol abuse, presented to the ED with ataxia. The patient is currently in the [facility] detox program; last drink of EtOH was 10 days ago. The patient said withdrawal symptoms have waned, but noticed onset of weakness in legs and changes in gait.      Review of Systems:  Constitutional: no fever or chills  Eyes: positive for glasses (last updated 4 years ago) and visual disturbance (loss of peripheral vision, double vision, blurred vision on distance)   ENT: no nasal congestion or sore throat  Respiratory: positive for rhinorrhea, sneezing, dry cough, negative for hemoptysis and sputum  Cardiovascular: no chest pain or palpitations  Gastrointestinal: no nausea or vomiting, no abdominal pain or change in bowel habits  Genitourinary: no hematuria or dysuria  Hematologic/Lymphatic: no easy bruising or lymphadenopathy      PAST HISTORY:     Past Medical History   Diagnosis Date   ·   ·  Alcohol abuse  Seizures   2005 2005           Past Surgical History   Procedure Laterality Date    Appendectomy  1984       History reviewed. No pertinent family history.    Social History     Social History    Marital status:  with wife     Spouse name: N/A    Number of children: N/A    Years of education: N/A     Social History Main Topics    Smoking status: Current Every Day Smoker for past 25 years     Packs/day: 0.50    Smokeless tobacco: None    Alcohol use 1 pint of vodka daily       Comment: 10 days sober    Drug use: No    Sexual activity: Not Asked     Other Topics Concern    None     Social History Narrative    None       MEDICATIONS & ALLERGIES:     Patient is not currently on any medications       Review of patient's allergies indicates:  No Known Allergies    OBJECTIVE:     Vital  Signs:  Temp:  [98.2 °F (36.8 °C)] 98.2 °F (36.8 °C)  Pulse:  [] 84  Resp:  [16] 16  SpO2:  [97 %-98 %] 97 %  BP: (150-158)/(80-85) 150/80  Body mass index is 19.67 kg/(m^2).     Physical Exam:  Patient is AOx3. Not in any distress. Patient is of normal color and hydration status appears adequate. Body habitus appears to be within the normal range.     HENT: Head:normocephalic, atraumatic. Ears: external ear canals normal. Nose: Nares normal. Septum midline. No drainage or sinus tenderness, no discharge. Throat: lips, mucosa, and tongue normal; teeth and gums normal  Eyes: conjunctivae/corneas clear. PERRL.   Neck: symmetrical, no JVD  Neurologic: Normal strength and tone. No focal numbness or weakness  Mental status: thought content appropriate  Cranial nerves: Reduced visual fields. Nystagmus seen on EO motions, especially in the left eye. All other CN intact  Sensory: Not examined  Power: 5/5 in all extremities  Reflexes: Not examined  Coordination: Not examined  Gait: Abnormal gait. Patient swings left leg forward to take step.   Psych/Behavioral:  Behavior: normal, Speech: appropriate quality, quantity and organization of sentences, Thought content: normal     Laboratory  Lab Results   Component Value Date    WBC 6.80 02/07/2017    HGB 13.4 (L) 02/07/2017    HCT 40.1 02/07/2017     (H) 02/07/2017     02/07/2017       Recent Labs  Lab 02/07/17  1547   GLU 99      K 3.7      CO2 29   BUN 6   CREATININE 0.7   CALCIUM 9.4   MG 1.3*           ASSESSMENT & PLAN:     Current Problems List:  Active Hospital Problems    Diagnosis  POA    *Wernicke encephalopathy [E51.2]  Yes    Macrocytic anemia [D53.9]  Yes    Ataxia [R27.0]  Yes    Transaminitis [R74.0]  Yes    Alcohol use disorder, severe, dependence [F10.20]  Yes    Alcohol abuse [F10.10]  Yes      Resolved Hospital Problems    Diagnosis Date Resolved POA   No resolved problems to display.       HIGH RISK CONDITION(S):  {HIGH  RISK CONDITIONS:39009}    Problem Assessment & Treatment Plan:            Signing Physician:  Emma Bowden

## 2017-02-08 NOTE — CONSULTS
[facility]  Neurology  Consult Note    Patient Name: Eros Vital  MRN: [id number]  Admission Date: 2/7/2017  Hospital Length of Stay: 1 days  Code Status: Full Code   Attending Provider: Madhuri Crandall MD   Consulting Provider: Pearl Soriano PA-C  Primary Care Physician: Paco Wagner MD  Principal Problem:Wernicke encephalopathy    Consults   Subjective:     Chief Complaint:  ?ataxia, rule out organic cause other than chronic ETOH abuse     HPI:   51 y.o. M with PMHx of ETOH abuse (1/2 pint of vodka daily since 2005), tobacco abuse (1/2 pack/day x 25 years), seizures and PTSD presented to ED 2/7/17 from group therapy for tremors and gait instability x 11 days since his last alcoholic drink. Patient reports ETOH abuse started after traumatic experience at time of [place] and has recently sought therapy here at [facility].Longest period without alcohol since 2005 has been the past 11 days. He describes his gait change as imbalance more so than weakness. He says he does ok with short distances such as bed to bathroom, but has been using a wheelchair for long distances at therapy. He is also complaining of peripheral vision loss and blurry vision. Since admission, vitamin B1 level has been drawn (level pending) and empiric IV thiamine replacement started. He notes some improvement in gait after 1 day of supplementation.                    Past Medical History   Diagnosis Date    Alcohol abuse        Past Surgical History   Procedure Laterality Date    Appendectomy         Review of patient's allergies indicates:  No Known Allergies    Current Neurological Medications: IV thiamine     No current facility-administered medications on file prior to encounter.      No current outpatient prescriptions on file prior to encounter.     Family History     None        Social History Main Topics    Smoking status: Current Every Day Smoker     Packs/day: 0.50    Smokeless tobacco: Not on file     Alcohol use No      Comment: 10 days sober    Drug use: No    Sexual activity: Not on file     Review of Systems   Constitutional: Negative for chills and fatigue.   Eyes: Positive for visual disturbance. Negative for pain.   Respiratory: Negative for shortness of breath.    Cardiovascular: Negative for chest pain.   Gastrointestinal: Negative for constipation, diarrhea, nausea and vomiting.   Musculoskeletal: Positive for gait problem. Negative for joint swelling and myalgias.   Neurological: Positive for tremors and seizures. Negative for speech difficulty, weakness, light-headedness, numbness and headaches.     Objective:     Vital Signs (Most Recent):  Temp: 98.9 °F (37.2 °C) (02/08/17 1200)  Pulse: 89 (02/08/17 1200)  Resp: 16 (02/08/17 1200)  BP: 120/67 (02/08/17 1200)  SpO2: 95 % (02/08/17 1200) Vital Signs (24h Range):  Temp:  [97.9 °F (36.6 °C)-98.9 °F (37.2 °C)] 98.9 °F (37.2 °C)  Pulse:  [84-97] 89  Resp:  [14-17] 16  SpO2:  [95 %-100 %] 95 %  BP: (120-150)/(67-97) 120/67     Weight: 65.8 kg (145 lb)  Body mass index is 19.67 kg/(m^2).    Physical Exam   Constitutional: He is oriented to person, place, and time. He appears well-developed and well-nourished.   HENT:   Head: Normocephalic and atraumatic.   Eyes: Pupils are equal, round, and reactive to light.   Neck: Normal range of motion.   Pulmonary/Chest: Effort normal.   Neurological: He is oriented to person, place, and time. He has an abnormal Heel to Vicente Test, an abnormal Romberg Test and an abnormal Tandem Gait Test. He has a normal Finger-Nose-Finger Test.       NEUROLOGICAL EXAMINATION:     MENTAL STATUS   Oriented to person, place, and time.   Follows 2 step commands.   Attention: normal. Concentration: normal.   Level of consciousness: alert  Knowledge: good.     CRANIAL NERVES     CN III, IV, VI   Pupils are equal, round, and reactive to light.  CN III: no CN III palsy  CN VI: no CN VI palsy  Nystagmus: bilateral   Nystagmus type:  horizontal  Diplopia: none    CN V   Facial sensation intact.     CN VII   Facial expression full, symmetric.     CN VIII   CN VIII normal.     CN IX, X   CN IX normal.   Palate: symmetric    CN XI   CN XI normal.   Right sternocleidomastoid strength: normal  Right trapezius strength: normal    CN XII   CN XII normal.   Tongue deviation: none    MOTOR EXAM   Muscle bulk: normal  Overall muscle tone: normal  Right arm pronator drift: absent  Left arm pronator drift: absent    Strength   Strength 5/5 except as noted.   Left strength: Remote left sided clavicular fracture   Left deltoid: 4/5    REFLEXES     Reflexes   Right ankle clonus: absent  Left ankle clonus: absent       Brisk throughout      SENSORY EXAM   Left leg light touch: decreased from knee  Right leg vibration: decreased from knee  Left leg vibration: decreased from knee  Romberg: positive    GAIT AND COORDINATION     Gait  Gait: wide-based     Coordination   Finger to nose coordination: normal  Heel to shin coordination: abnormal  Tandem walking coordination: abnormal       Cautious and wide based gait        Significant Labs:   CMP:   Recent Labs  Lab 02/07/17  1547 02/08/17  0522   GLU 99 96    138   K 3.7 3.2*    105   CO2 29 25   BUN 6 5*   CREATININE 0.7 0.6   CALCIUM 9.4 8.6*   MG 1.3* 1.6   PROT 7.3 6.6   ALBUMIN 3.7 3.3*   BILITOT 0.5 0.5   ALKPHOS 151* 131   AST 70* 66*   * 94*   ANIONGAP 8 8   EGFRNONAA >60.0 >60.0     All pertinent lab results from the past 24 hours have been reviewed.    Significant Imaging: I have reviewed all pertinent imaging results/findings within the past 24 hours.     MRI Brain WO contrast 2/7/17: Age advanced volume loss without acute intracranial pathology.             Assessment and Plan:     * Wernicke encephalopathy  -Vitamin B1 level pending from 2/7/17  Empiric IV thiamine replacement started yesterday  -MRI Brain with volume loss and no acute intracranial pathology  -Neuro exam remarkable  for cautious, wide based gait (not ataxia) likely 2/2 combination of proprioceptive issues from peripheral neuropathy (diminished vibratory sense) and chronic alcohol abuse.   -Continue thiamine replacement and follow-up with outpatient Neurology in 2 months for further monitoring of gait       VTE Risk Mitigation         Ordered     enoxaparin injection 40 mg  Daily     Route:  Subcutaneous        02/07/17 1630     Medium Risk of VTE  Once      02/07/17 1630          Thank you for your consult. I will sign off. Please contact us if you have any additional questions.   Dr. Cruz attestation to follow    Pearl Soriano PA-C  General Neurology Consult  Neuro Consult [facility] # [id number]

## 2017-02-08 NOTE — ASSESSMENT & PLAN NOTE
- likely due to liver injury from chronic alcohol abuse  - levels recovering  - continue to monitor

## 2017-02-08 NOTE — ASSESSMENT & PLAN NOTE
-Concerning for progression of Wernicke's vs. Infectious etiology  -PT/OT ordered  -Fall precautions\  -MRI brain: negative for cerebellar pathologies  -Neurology consulted for advice on further eval/mgmt of gait abnormalitiy

## 2017-02-08 NOTE — PLAN OF CARE
Problem: Physical Therapy Goal  Goal: Physical Therapy Goal  Goals to be met by: 2017     Patient will increase functional independence with mobility by performin. Sit to stand transfer with Modified [place]  2. Gait x 200 feet with Supervision without AD.   Outcome: Ongoing (interventions implemented as appropriate)  Pt evaluation complete.      EMIR WHYTE, PT  2017

## 2017-02-09 VITALS
HEIGHT: 72 IN | SYSTOLIC BLOOD PRESSURE: 155 MMHG | HEART RATE: 78 BPM | OXYGEN SATURATION: 96 % | TEMPERATURE: 98 F | RESPIRATION RATE: 14 BRPM | DIASTOLIC BLOOD PRESSURE: 74 MMHG | WEIGHT: 145 LBS | BODY MASS INDEX: 19.64 KG/M2

## 2017-02-09 LAB
ALBUMIN SERPL BCP-MCNC: 3.4 G/DL
ALP SERPL-CCNC: 127 U/L
ALT SERPL W/O P-5'-P-CCNC: 84 U/L
ANION GAP SERPL CALC-SCNC: 8 MMOL/L
AST SERPL-CCNC: 57 U/L
BASOPHILS # BLD AUTO: 0.07 K/UL
BASOPHILS NFR BLD: 1.2 %
BILIRUB SERPL-MCNC: 0.5 MG/DL
BUN SERPL-MCNC: 4 MG/DL
CALCIUM SERPL-MCNC: 9.1 MG/DL
CHLORIDE SERPL-SCNC: 107 MMOL/L
CO2 SERPL-SCNC: 23 MMOL/L
CREAT SERPL-MCNC: 0.6 MG/DL
DIFFERENTIAL METHOD: ABNORMAL
EOSINOPHIL # BLD AUTO: 0.3 K/UL
EOSINOPHIL NFR BLD: 4.4 %
ERYTHROCYTE [DISTWIDTH] IN BLOOD BY AUTOMATED COUNT: 12 %
EST. GFR  (AFRICAN AMERICAN): >60 ML/MIN/1.73 M^2
EST. GFR  (NON AFRICAN AMERICAN): >60 ML/MIN/1.73 M^2
GLUCOSE SERPL-MCNC: 94 MG/DL
HCT VFR BLD AUTO: 39.4 %
HGB BLD-MCNC: 13.6 G/DL
LYMPHOCYTES # BLD AUTO: 1.8 K/UL
LYMPHOCYTES NFR BLD: 31 %
MAGNESIUM SERPL-MCNC: 1.5 MG/DL
MCH RBC QN AUTO: 34.7 PG
MCHC RBC AUTO-ENTMCNC: 34.5 %
MCV RBC AUTO: 101 FL
MONOCYTES # BLD AUTO: 1 K/UL
MONOCYTES NFR BLD: 17.9 %
NEUTROPHILS # BLD AUTO: 2.6 K/UL
NEUTROPHILS NFR BLD: 45.1 %
PHOSPHATE SERPL-MCNC: 3.7 MG/DL
PLATELET # BLD AUTO: 356 K/UL
PMV BLD AUTO: 9.4 FL
POTASSIUM SERPL-SCNC: 3.8 MMOL/L
PROT SERPL-MCNC: 7 G/DL
RBC # BLD AUTO: 3.92 M/UL
SODIUM SERPL-SCNC: 138 MMOL/L
WBC # BLD AUTO: 5.71 K/UL

## 2017-02-09 PROCEDURE — 63600175 PHARM REV CODE 636 W HCPCS: Performed by: STUDENT IN AN ORGANIZED HEALTH CARE EDUCATION/TRAINING PROGRAM

## 2017-02-09 PROCEDURE — 85025 COMPLETE CBC W/AUTO DIFF WBC: CPT

## 2017-02-09 PROCEDURE — 84100 ASSAY OF PHOSPHORUS: CPT

## 2017-02-09 PROCEDURE — 25000003 PHARM REV CODE 250: Performed by: STUDENT IN AN ORGANIZED HEALTH CARE EDUCATION/TRAINING PROGRAM

## 2017-02-09 PROCEDURE — 83735 ASSAY OF MAGNESIUM: CPT

## 2017-02-09 PROCEDURE — 99238 HOSP IP/OBS DSCHRG MGMT 30/<: CPT | Mod: ,,, | Performed by: HOSPITALIST

## 2017-02-09 PROCEDURE — 36415 COLL VENOUS BLD VENIPUNCTURE: CPT

## 2017-02-09 PROCEDURE — 80053 COMPREHEN METABOLIC PANEL: CPT

## 2017-02-09 RX ORDER — LANOLIN ALCOHOL/MO/W.PET/CERES
100 CREAM (GRAM) TOPICAL DAILY
COMMUNITY
Start: 2017-02-09

## 2017-02-09 RX ORDER — IBUPROFEN 200 MG
1 TABLET ORAL DAILY
Refills: 0 | COMMUNITY
Start: 2017-02-09

## 2017-02-09 RX ADMIN — FLUTICASONE PROPIONATE 2 SPRAY: 50 SPRAY, METERED NASAL at 09:02

## 2017-02-09 RX ADMIN — CETIRIZINE HYDROCHLORIDE 5 MG: 5 TABLET, FILM COATED ORAL at 09:02

## 2017-02-09 RX ADMIN — THIAMINE HYDROCHLORIDE 500 MG: 100 INJECTION, SOLUTION INTRAMUSCULAR; INTRAVENOUS at 03:02

## 2017-02-09 NOTE — PLAN OF CARE
Problem: Patient Care Overview  Goal: Plan of Care Review  Outcome: Outcome(s) achieved Date Met:  02/09/17  Pt to be D/C to home. Pt will take taxi to home.     Pt to be provided D/C instructions and F/U information.     Pt has maintained stable VS and afebrile during shift.

## 2017-02-09 NOTE — PT/OT/SLP DISCHARGE
Physical Therapy Discharge Summary    Eros Vital  MRN: [id number]   Wernicke encephalopathy   Patient Discharged from acute Physical Therapy on 2017.  Please refer to prior PT noted date on 2017 for functional status.     Assessment:   Patient appropriate for care in another setting.  GOALS:   Physical Therapy Goals        Problem: Physical Therapy Goal    Goal Priority Disciplines Outcome Goal Variances Interventions   Physical Therapy Goal     PT/OT, PT Ongoing (interventions implemented as appropriate)     Description:  Goals to be met by: 2017     Patient will increase functional independence with mobility by performin. Sit to stand transfer with Modified [place]  2. Gait  x 200 feet with Supervision without AD.               Reasons for Discontinuation of Therapy Services  Transfer to alternate level of care.      Plan:  Patient Discharged to: Outpatient Therapy Services     EMIR WHYTE, PT.  2017

## 2017-02-09 NOTE — DISCHARGE SUMMARY
"DISCHARGE SUMMARY  Hospital Medicine    Team: [facility] 3    Patient Name: Eros Vital  YOB: 1965    Admit Date: 2/7/2017    Discharge Date: 02/09/2017    Discharge Attending Physician: Madhuri Crandall MD     Admitting Resident: [place]    Diagnoses:  Active Hospital Problems    Diagnosis  POA    *Wernicke encephalopathy [E51.2]  Yes     Priority: 1 - High    Macrocytic anemia [D53.9]  Yes    Ataxia [R27.0]  Yes    Transaminitis [R74.0]  Yes    Tobacco user [Z72.0]  Yes    Hypomagnesemia [E83.42]  Yes    Peripheral vision loss [H53.459]  Yes    Alcohol abuse [F10.10]  Yes      Resolved Hospital Problems    Diagnosis Date Resolved POA   No resolved problems to display.       Discharged Condition: admit problems have stabilized     HOSPITAL COURSE:      Initial Presentation:     Mr Vital is a 50 y/o M with alcohol abuse (approx 0.5 pint vodka /day since 2005), active tobacco use (0.5 pack per day for last 25 years who presents to ER under instruction by BMU unit to go to ER. Approximately 10 - days prior to admission, patient realized that he was excessivly dependent and decided to stop abuse. He started coming to outpatient ariel at [facility].   Since undergoing detox, he began experiencing alcohol withdrawal symtoms (tremors, inabitily to stand, gait problem). Eventually the tremor resolved but the ataxia continued to worsen. Patient asked doctor at BMU for eval of ataxia. He describes feeling weak in his leg muscles bilaterally and feeling "off-balance". He has not had a falll but has been using wheelchair to travel.      Course of Principle Problem for Admission:    Mr Vital was admitted to  3 for IV Thiamine replacement. He completed 5 doses of Thiamine. He was provided with an MRI to evaluate his baseline visual disturbance as well as his ataxia and no acute findings were present. He was assessed by PT/OT as well as Neurology for his ataxia and was found to be improving. He will need " to follow up with Neurology in 2 weeks and will have outpt PT. He is discharged with Thiamine 100mg PO qday.    Other Medical Problems Addressed in the Hospital:    Alcohol abuse   Last drink ~12 days ago. Pt completed valium taper in the outpt setting. He was assessed with YAZMIN spraguehift and no evidence of withdrawal was noted. Pt is recommend follow up with addiction psych upon discharge     Macrocytic anemia   B12 levels elevated, therefore anemia likely due to folate deficiency from poor nutrition. Given patient's initiative to cease alcohol abuse, will advise recommend appropriate nutritional intake upon discharge     Ataxia   Concerning for progression of Wernicke's. PT/OT ordered. Fall precautions were maintained. MRI brain: negative for cerebellar pathologies. Neurology consulted for advice on further eval/mgmt of gait abnormality who recommended PT and out pt follow up.     Transaminitis   Likely due to liver injury from chronic alcohol abuse. Levels noted to be recovering.    CONSULTS: Psychiatry and Neurology    Last CBC/BMP/HgbA1c (if applicable):  Recent Results (from the past 336 hour(s))   CBC with Automated Differential    Collection Time: 02/09/17  7:24 AM   Result Value Ref Range    WBC 5.71 3.90 - 12.70 K/uL    Hemoglobin 13.6 (L) 14.0 - 18.0 g/dL    Hematocrit 39.4 (L) 40.0 - 54.0 %    Platelets 356 (H) 150 - 350 K/uL   CBC with Automated Differential    Collection Time: 02/08/17  5:22 AM   Result Value Ref Range    WBC 6.49 3.90 - 12.70 K/uL    Hemoglobin 12.5 (L) 14.0 - 18.0 g/dL    Hematocrit 36.6 (L) 40.0 - 54.0 %    Platelets 332 150 - 350 K/uL   CBC auto differential    Collection Time: 02/07/17  3:47 PM   Result Value Ref Range    WBC 6.80 3.90 - 12.70 K/uL    Hemoglobin 13.4 (L) 14.0 - 18.0 g/dL    Hematocrit 40.1 40.0 - 54.0 %    Platelets 337 150 - 350 K/uL     No results found for this or any previous visit (from the past 336 hour(s)).  No results found for: HGBA1C    Other Pertinent  Lab Findings:  Thiamine level pending    Pertinent/Significant Diagnostic Studies:  MRI brain without acute finding.    Disposition:  Home     Future Scheduled Appointments:  No future appointments.    Follow-up Plans from This Hospitalization:  - Neurology in 2 weeks  - PT   - Follow up with addiction psych. Pt will need to call [phone], option 2 to schedule re-admit.     Discharge Medication List:     Eros Vital   Home Medication Instructions ALICJA:[id number]    Printed on:02/09/17 5115   Medication Information                      nicotine (NICODERM CQ) 21 mg/24 hr  Place 1 patch onto the skin once daily.             thiamine 100 MG tablet  Take 1 tablet (100 mg total) by mouth once daily.                 Patient Instructions:    Discharge Procedure Orders  Ambulatory Referral to Physical/Occupational Therapy   Referral Priority: Routine Referral Type: Physical Medicine   Referral Reason: Specialty Services Required    Number of Visits Requested: 1      Diet general     Call MD for:  temperature >100.4     Call MD for:  persistent nausea and vomiting or diarrhea     Call MD for:  severe uncontrolled pain     Call MD for:  redness, tenderness, or signs of infection (pain, swelling, redness, odor or green/yellow discharge around incision site)     Call MD for:  difficulty breathing or increased cough     Call MD for:  severe persistent headache     Call MD for:  worsening rash     Call MD for:  persistent dizziness, light-headedness, or visual disturbances     Call MD for:  increased confusion or weakness         Signing Physician:  Alejandra Cuellar MD

## 2017-02-09 NOTE — SUBJECTIVE & OBJECTIVE
"Interval History: Completed Thiamine therapy. Seen by Neurology and Psych. Assessed by PT.    Review of Systems   Constitutional: Negative for activity change, chills, diaphoresis and fever.   HENT: Positive for rhinorrhea and sneezing. Negative for congestion, sinus pressure, sore throat and trouble swallowing.    Eyes: Negative for visual disturbance.   Respiratory: Negative for cough, shortness of breath and wheezing.    Cardiovascular: Negative for chest pain, palpitations and leg swelling.   Gastrointestinal: Negative for abdominal distention, constipation, diarrhea, nausea and vomiting.   Musculoskeletal: Positive for gait problem. Negative for arthralgias.   Neurological: Positive for tremors. Negative for dizziness, weakness, light-headedness and headaches.        Feels "off-balance"   Psychiatric/Behavioral: Nervous/anxious: mild-moderate.      Objective:     Vital Signs (Most Recent):  Temp: 98.5 °F (36.9 °C) (02/09/17 0400)  Pulse: 79 (02/09/17 0400)  Resp: 18 (02/09/17 0400)  BP: (!) 164/88 (02/09/17 0400)  SpO2: 96 % (02/09/17 0400) Vital Signs (24h Range):  Temp:  [97.9 °F (36.6 °C)-99 °F (37.2 °C)] 98.5 °F (36.9 °C)  Pulse:  [79-96] 79  Resp:  [14-18] 18  SpO2:  [95 %-100 %] 96 %  BP: (120-164)/(67-88) 164/88     Weight: 65.8 kg (145 lb)  Body mass index is 19.67 kg/(m^2).    Intake/Output Summary (Last 24 hours) at 02/09/17 0716  Last data filed at 02/08/17 1600   Gross per 24 hour   Intake                0 ml   Output              600 ml   Net             -600 ml      Physical Exam   Constitutional: He is oriented to person, place, and time. He appears well-developed and well-nourished. Distressed: mild.   HENT:   Head: Normocephalic and atraumatic.   Eyes: Conjunctivae are normal. Pupils are equal, round, and reactive to light.   Horizontal nystamus of left eye on lateral eye movements     Neck: No JVD present.   Cardiovascular: Normal rate, regular rhythm, normal heart sounds and intact distal " pulses.    No murmur heard.  Pulmonary/Chest: Effort normal and breath sounds normal. No respiratory distress. He has no wheezes.   Abdominal: Soft. Bowel sounds are normal.   Musculoskeletal: He exhibits no edema.   Neurological: He is alert and oriented to person, place, and time. No cranial nerve deficit.   Upper extremities - 5/5/ strength bilaterally. No loss of sensation  Lower extremities. 5/5/ strength bilaterally  Low amplitude high frequency intention tremor noted in bilateral hands   Skin: He is not diaphoretic.   Vitals reviewed.      Significant Labs:   CBC:   Recent Labs  Lab 02/07/17  1547 02/08/17  0522   WBC 6.80 6.49   HGB 13.4* 12.5*   HCT 40.1 36.6*    332     CMP:   Recent Labs  Lab 02/07/17  1547 02/08/17  0522    138   K 3.7 3.2*    105   CO2 29 25   GLU 99 96   BUN 6 5*   CREATININE 0.7 0.6   CALCIUM 9.4 8.6*   PROT 7.3 6.6   ALBUMIN 3.7 3.3*   BILITOT 0.5 0.5   ALKPHOS 151* 131   AST 70* 66*   * 94*   ANIONGAP 8 8   EGFRNONAA >60.0 >60.0

## 2017-02-09 NOTE — ASSESSMENT & PLAN NOTE
- due to chronic alcohol abuse  - associated with gait ataxia, left eye nystagmus and improving with therapy  - Baseline Vitamin B1 level ordered  - Thiamine 500mg IV BID for four doses complete

## 2017-02-09 NOTE — PROGRESS NOTES
"[facility] Hospital Medicine  Progress Note    Patient Name: Eros Vital  MRN: [id number]  Patient Class: IP- Inpatient   Admission Date: 2/7/2017  Length of Stay: 2 days  Attending Physician: Madhuri Crandall MD  Primary Care Provider: Paco Wagner MD    [facility] Medicine Team: [facility] 3 Alejandra Cuellar MD    Subjective:     Principal Problem:Wernicke encephalopathy    HPI:  Patient is a 50 y/o M with alcohol abuse (approx 0.5 pint vodka /day since 2005), active tobacco use (0.5 pack per day for last 25 years who presents to ER under instruction by [facility]U unit to go to ER.   10 - days ago, patient suddenly realized that he was excessivly dependent and decided to stop abuse. [facility].  Last drink 10 days ago. Patient notes alcohol withdrawal symtoms (tremors, inabitily to stand, gait problem) for a few days. eventually the tremor resolved but the ataxia continued to worsen. Patient asked doctor at [facility] for eval of ataxia.    For ataxia, he describes feeling weak in his leg muscles bilaterally and feeling "off-balance". He has not had a fall but has been using wheelchair to travel.     Notes chronic loss of peripheral vision and chronic loss of decreased hearing bilaterally.     Patient denies previous significant history of rehab. Longest time period without alcohol since 2005: 10 days (this episode). CAGE positive. H/o seizures in the past requiring treatment prior to 2005. Currently no meds.   He also smokes 0.5 ppd for last 20+ years.       Hospital Course:  Treated for wernicke's encephalopathy by giving thiamine. Improvement in gait and tremor noted. Seen by Psychiatry and by Neurology. Will discharge on PO thiamine replacement and will follow up with the afore mentioned services.        Interval History: Completed Thiamine therapy. Seen by Neurology and Psych. Assessed by PT.    Review of Systems   Constitutional: Negative for activity change, " "chills, diaphoresis and fever.   HENT: Positive for rhinorrhea and sneezing. Negative for congestion, sinus pressure, sore throat and trouble swallowing.    Eyes: Negative for visual disturbance.   Respiratory: Negative for cough, shortness of breath and wheezing.    Cardiovascular: Negative for chest pain, palpitations and leg swelling.   Gastrointestinal: Negative for abdominal distention, constipation, diarrhea, nausea and vomiting.   Musculoskeletal: Positive for gait problem. Negative for arthralgias.   Neurological: Positive for tremors. Negative for dizziness, weakness, light-headedness and headaches.        Feels "off-balance"   Psychiatric/Behavioral: Nervous/anxious: mild-moderate.      Objective:     Vital Signs (Most Recent):  Temp: 98.5 °F (36.9 °C) (02/09/17 0400)  Pulse: 79 (02/09/17 0400)  Resp: 18 (02/09/17 0400)  BP: (!) 164/88 (02/09/17 0400)  SpO2: 96 % (02/09/17 0400) Vital Signs (24h Range):  Temp:  [97.9 °F (36.6 °C)-99 °F (37.2 °C)] 98.5 °F (36.9 °C)  Pulse:  [79-96] 79  Resp:  [14-18] 18  SpO2:  [95 %-100 %] 96 %  BP: (120-164)/(67-88) 164/88     Weight: 65.8 kg (145 lb)  Body mass index is 19.67 kg/(m^2).    Intake/Output Summary (Last 24 hours) at 02/09/17 0716  Last data filed at 02/08/17 1600   Gross per 24 hour   Intake                0 ml   Output              600 ml   Net             -600 ml      Physical Exam   Constitutional: He is oriented to person, place, and time. He appears well-developed and well-nourished. Distressed: mild.   HENT:   Head: Normocephalic and atraumatic.   Eyes: Conjunctivae are normal. Pupils are equal, round, and reactive to light.   Horizontal nystamus of left eye on lateral eye movements     Neck: No JVD present.   Cardiovascular: Normal rate, regular rhythm, normal heart sounds and intact distal pulses.    No murmur heard.  Pulmonary/Chest: Effort normal and breath sounds normal. No respiratory distress. He has no wheezes.   Abdominal: Soft. Bowel sounds " are normal.   Musculoskeletal: He exhibits no edema.   Neurological: He is alert and oriented to person, place, and time. No cranial nerve deficit.   Upper extremities - 5/5/ strength bilaterally. No loss of sensation  Lower extremities. 5/5/ strength bilaterally  Low amplitude high frequency intention tremor noted in bilateral hands   Skin: He is not diaphoretic.   Vitals reviewed.      Significant Labs:   CBC:   Recent Labs  Lab 02/07/17  1547 02/08/17  0522   WBC 6.80 6.49   HGB 13.4* 12.5*   HCT 40.1 36.6*    332     CMP:   Recent Labs  Lab 02/07/17  1547 02/08/17  0522    138   K 3.7 3.2*    105   CO2 29 25   GLU 99 96   BUN 6 5*   CREATININE 0.7 0.6   CALCIUM 9.4 8.6*   PROT 7.3 6.6   ALBUMIN 3.7 3.3*   BILITOT 0.5 0.5   ALKPHOS 151* 131   AST 70* 66*   * 94*   ANIONGAP 8 8   EGFRNONAA >60.0 >60.0           Assessment/Plan:      * Wernicke encephalopathy  - due to chronic alcohol abuse  - associated with gait ataxia, left eye nystagmus and improving with therapy  - Baseline Vitamin B1 level ordered  - Thiamine 500mg IV BID for four doses complete      Alcohol abuse  - Last drink ~12 days ago  - Completed valium taper  - CIWAAR qshift  - recommend follow up with addiction psych upon discharge    Macrocytic anemia  - B12 levels elevated, therefore anemia likely due to folate deficiency from poor nutrition  - given patient's initiative to cease alcohol abuse, will advise recommend appropriate nutritional intake upon discharge    Ataxia  -Concerning for progression of Wernicke's vs. Infectious etiology  -PT/OT ordered  -Fall precautions  -MRI brain: negative for cerebellar pathologies  -Neurology consulted for advice on further eval/mgmt of gait abnormalitiy, appreciate recs    Transaminitis  - likely due to liver injury from chronic alcohol abuse  - levels recovering  - continue to monitor    VTE Risk Mitigation         Ordered     enoxaparin injection 40 mg  Daily     Route:   Subcutaneous        02/07/17 1630     Medium Risk of VTE  Once      02/07/17 1630          Alejandra Cuellar MD  Department of Hospital Medicine   [facility]    Discussed with Dr Crandall                      02/09/2017                             STAFF PHYSICIAN NOTE                                   Attending Attestation for Rounds with Resident  I have reviewed and concur with the resident's history, physical, assessment, and plan.  I have personally interviewed and examined the patient at bedside and agree with the resident's findings.                                  ________________________________________                                     REASON FOR ADMISSION:     Patient is 51 y.o.male    Body mass index is 19.67 kg/(m^2).,  Wernicke encephalopathy

## 2017-02-09 NOTE — ASSESSMENT & PLAN NOTE
- Last drink ~12 days ago  - Completed valium taper  - YAZMIN qshift  - recommend follow up with addiction psych upon discharge
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
## 2017-02-09 NOTE — ASSESSMENT & PLAN NOTE
-Concerning for progression of Wernicke's vs. Infectious etiology  -PT/OT ordered  -Fall precautions  -MRI brain: negative for cerebellar pathologies  -Neurology consulted for advice on further eval/mgmt of gait abnormalitiy, appreciate recs

## 2017-02-10 LAB — VIT B1 SERPL-MCNC: 59 UG/L (ref 38–122)

## 2017-02-14 ENCOUNTER — HOSPITAL ENCOUNTER (OUTPATIENT)
Dept: PSYCHIATRY | Facility: HOSPITAL | Age: 52
Discharge: HOME | End: 2017-02-14
Attending: PSYCHIATRY & NEUROLOGY
Payer: COMMERCIAL

## 2017-02-14 VITALS
TEMPERATURE: 99 F | RESPIRATION RATE: 16 BRPM | SYSTOLIC BLOOD PRESSURE: 140 MMHG | HEIGHT: 72 IN | HEART RATE: 111 BPM | BODY MASS INDEX: 19.66 KG/M2 | DIASTOLIC BLOOD PRESSURE: 90 MMHG | WEIGHT: 145.13 LBS

## 2017-02-14 DIAGNOSIS — F10.20 ALCOHOL USE DISORDER, SEVERE, DEPENDENCE: Primary | ICD-10-CM

## 2017-02-14 DIAGNOSIS — F10.10 ALCOHOL ABUSE: ICD-10-CM

## 2017-02-14 LAB
AMPHET+METHAMPHET UR QL: NEGATIVE
BARBITURATES UR QL SCN>200 NG/ML: NEGATIVE
BENZODIAZ UR QL SCN>200 NG/ML: NORMAL
BREATH ALCOHOL: 0
BZE UR QL SCN: NEGATIVE
CANNABINOIDS UR QL SCN: NEGATIVE
CREAT UR-MCNC: 316 MG/DL
ETHANOL UR-MCNC: <10 MG/DL
METHADONE UR QL SCN>300 NG/ML: NEGATIVE
OPIATES UR QL SCN: NEGATIVE
PCP UR QL SCN>25 NG/ML: NEGATIVE
TOXICOLOGY INFORMATION: NORMAL

## 2017-02-14 PROCEDURE — 90853 GROUP PSYCHOTHERAPY: CPT

## 2017-02-14 PROCEDURE — 90853 GROUP PSYCHOTHERAPY: CPT | Performed by: SOCIAL WORKER

## 2017-02-14 PROCEDURE — 80307 DRUG TEST PRSMV CHEM ANLYZR: CPT

## 2017-02-14 PROCEDURE — 90853 GROUP PSYCHOTHERAPY: CPT | Mod: ,,, | Performed by: PSYCHOLOGIST

## 2017-02-14 PROCEDURE — 99222 1ST HOSP IP/OBS MODERATE 55: CPT | Mod: ,,, | Performed by: INTERNAL MEDICINE

## 2017-02-14 PROCEDURE — 90853 GROUP PSYCHOTHERAPY: CPT | Mod: 59,,, | Performed by: PSYCHOLOGIST

## 2017-02-14 NOTE — PLAN OF CARE
02/14/17 1400   Activity/Group Therapy Checklist   Group Relapse Prevention  (Living sober concepts: serenity prayer; live and let live; avoiding the first drink)   Attendance Attended   Follows Direction Followed directions   Group Interactions/Observations Interacted appropriately   Affect/Mood Range Normal range   Affect/Mood Display Appropriate   Goal Progression Progressing
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
## 2017-02-14 NOTE — PROGRESS NOTES
Group Psychotherapy (PhD/LCSW)    Site: [facility]    Clinical status of patient: Intensive Outpatient Program (IOP)    Date: 2/14/2017    Group Focus: Psychodynamic Group Psychotherapy    Length of service: 70482 - 45-50 minutes    Number of patients in attendance: 12    Referred by: Addictive Behavior Unit Treatment Team    Target symptoms: Alcohol Abuse    Patient's response to treatment: Active Listening and Self-disclosure    Progress toward goals: Progressing slowly    Interval History: Discussed recent hospital stay due to Wernicke's encephalopathy. Admitted he drank champagne last night with wife to celebrate their anniversary.    Diagnosis: alcohol use disorder, severe, dependence    Plan: Continue treatment on ABU

## 2017-02-14 NOTE — PROGRESS NOTES
Group Psychotherapy (PhD/LCSW)    Site: [facility]    Clinical status of patient: Intensive Outpatient Program (IOP)    Date: 2/14/2017    Group Focus: Disease Model of Addiction    Length of service: 07684 - 45-50 minutes    Number of patients in attendance: 12    Referred by: Addictive Behavior Unit Treatment Team    Target symptoms: Alcohol Abuse    Patient's response to treatment: Active Listening and Self-disclosure    Progress toward goals: Progressing adequately    Interval History: Discussed the basic neuropsychological concepts of the Disease Model of Addiction and how they relate to the subjective phenomena of addiction (powerlessness; euphoric recall; chronicity; relapse; etc). Discussed the value of understanding the Disease Model for sustaining long-term sobriety.     Diagnosis: alcohol use disorder, severe, dependence    Plan: Continue treatment on ABU

## 2017-02-14 NOTE — PLAN OF CARE
02/14/17 1000   Activity/Group Therapy Checklist   Group Educational  (codependency        )   Attendance Attended   Follows Direction Followed directions   Group Interactions/Observations Did not interact   Affect/Mood Range Blunted/flat   Affect/Mood Display Appropriate

## 2017-02-15 ENCOUNTER — HOSPITAL ENCOUNTER (OUTPATIENT)
Dept: PSYCHIATRY | Facility: HOSPITAL | Age: 52
Discharge: HOME | End: 2017-02-15
Attending: PSYCHIATRY & NEUROLOGY
Payer: COMMERCIAL

## 2017-02-15 VITALS — SYSTOLIC BLOOD PRESSURE: 129 MMHG | RESPIRATION RATE: 18 BRPM | HEART RATE: 86 BPM | DIASTOLIC BLOOD PRESSURE: 82 MMHG

## 2017-02-15 DIAGNOSIS — F10.10 ALCOHOL ABUSE: ICD-10-CM

## 2017-02-15 DIAGNOSIS — F10.20 ALCOHOL USE DISORDER, SEVERE, DEPENDENCE: Primary | ICD-10-CM

## 2017-02-15 LAB
AMPHET+METHAMPHET UR QL: NEGATIVE
BARBITURATES UR QL SCN>200 NG/ML: NEGATIVE
BENZODIAZ UR QL SCN>200 NG/ML: NORMAL
BREATH ALCOHOL: 0
BZE UR QL SCN: NEGATIVE
CANNABINOIDS UR QL SCN: NEGATIVE
CREAT UR-MCNC: 246 MG/DL
ETHANOL UR-MCNC: <10 MG/DL
METHADONE UR QL SCN>300 NG/ML: NEGATIVE
OPIATES UR QL SCN: NEGATIVE
PCP UR QL SCN>25 NG/ML: NEGATIVE
TOXICOLOGY INFORMATION: NORMAL

## 2017-02-15 PROCEDURE — 90853 GROUP PSYCHOTHERAPY: CPT

## 2017-02-15 PROCEDURE — 90853 GROUP PSYCHOTHERAPY: CPT | Performed by: SOCIAL WORKER

## 2017-02-15 PROCEDURE — 99232 SBSQ HOSP IP/OBS MODERATE 35: CPT | Mod: ,,, | Performed by: INTERNAL MEDICINE

## 2017-02-15 PROCEDURE — 90853 GROUP PSYCHOTHERAPY: CPT | Mod: 59,,, | Performed by: PSYCHOLOGIST

## 2017-02-15 PROCEDURE — 80307 DRUG TEST PRSMV CHEM ANLYZR: CPT

## 2017-02-15 NOTE — PATIENT CARE CONFERENCE
Alcohol Use Disorder, Severe  PTSD      1. Pt is attending all groups    2. Pt is attending all meetings  3. Pt 's family is supportive of treatment    4. Pt has completed spiritual assessment    5. Pt will present life story    6. Pt will present Step One assignment    7. Pt is exploring issues related to relapse  prevention; spirituality; stress management; improved communication skills; assertiveness training; poor self-esteem; disease concepts; cross addictions; and, work related issues    8. D/C date: TBD          Staff discussed pt's readmittance to program after admittance to hospital medicine for Wernicke's syndrome. Staffing discussed placing pt on probation for relapsing on alcohol day before re entering program. Staffing also discussed pt's elevated blood pressure and continued unsteady gait. Staffing also discussed possible neurocognitive issues due to alcohol use.    Problem: Alcohol Use Disorder, Severe  Goal: Address in 12 step meetings and group and individual sessions    Objective Measure: participation in groups, self report, length of sobriety, and relapse prevention plan  Time: Prior to discharge    Progress: Pt is attending groups and sessions       Problem: PTSD  Goal: Address in 12 step meetings and group and individual sessions    Objective Measure: participation in groups, self report, length of sobriety, and relapse prevention plan  Time: Prior to discharge    Progress: Pt is attending groups and sessions         Staff members present:    MD Dr. You Marroquin MD Dr. Correa, Ph.D.  [facility]  Florina Colin, [facility]  Kimberli Ackerman RN

## 2017-02-15 NOTE — PROGRESS NOTES
Group Psychotherapy (PhD/LCSW)    Site: [facility]    Clinical status of patient: Intensive Outpatient Program (IOP)    Date: 2/15/2017    Group Focus: Stress Management    Length of service: 73617 - 45-50 minutes    Number of patients in attendance: 20    Referred by: Addictive Behavior Unit Treatment Team    Target symptoms: Alcohol Abuse    Patient's response to treatment: Active Listening and Self-disclosure    Progress toward goals: Progressing adequately    Interval History: Identified stressors via the Stress Map..    Diagnosis: alcohol use disorder, severe, dependence    Plan: Continue treatment on ABU

## 2017-02-15 NOTE — PROGRESS NOTES
Group Psychotherapy (PhD/LCSW)    Site: [facility]    Clinical status of patient: Intensive Outpatient Program (IOP)    Date: 2/15/2017    Group Focus: Psychodynamic Group Psychotherapy    Length of service: 33944 - 45-50 minutes    Number of patients in attendance: 12    Referred by: Addictive Behavior Unit Treatment Team    Target symptoms: Alcohol Abuse    Patient's response to treatment: Active Listening and Self-disclosure    Progress toward goals: Progressing slowly    Interval History: Shared his story with new group member.    Diagnosis: alcohol use disorder, severe, dependence    Plan: Continue treatment on ABU

## 2017-02-15 NOTE — PLAN OF CARE
02/15/17 1000   Activity/Group Therapy Checklist   Group Addiction Education  (Peer presented life story; feedback given. )   Attendance Attended   Follows Direction Followed directions   Group Interactions/Observations Interacted appropriately   Affect/Mood Range Normal range   Affect/Mood Display Appropriate   Goal Progression Progressing

## 2017-02-15 NOTE — TREATMENT PLAN
Met individually with patient to discuss treatment plan progress and challenges.  See patient care conference note for content.  Placed patient on probation for drinking champagne 2/13/17, which he admitted to.  Discussed the episode with him and gave him questionnaire to complete and return to staff by the next day.  Patient provided with printed list of area residential treatment programs; discussed appropriateness of residential treatment is he cannot maintain compliance with day treatment program requirements.  Patient stated understanding.

## 2017-02-15 NOTE — PROGRESS NOTES
"PSYCHIATRY  ABU Partial Hospitalization  PROGRESS NOTE    Patient Name: Eros Vital  2/15/2017  11:03 AM  Start Date: 2017  : 1965    Status: Intensive Outpatient Program (IOP)    SUBJECTIVE:  Pt reports "pretty good" mood today.  He is a little exasperated with his morning's events.  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  Pt reports improving unsteadiness and imbalance.  Reports some mild anxiety, left-sided hand tremor.  (+) bilateral nystagmus.  Sleeping and eating without incident.  Going to AA meetings (has only been to two).         Medication Side Effects: None  Cravings: no  No other acute psychiatric issues reported at this time.    Scheduled Meds:   Current Outpatient Prescriptions on File Prior to Encounter   Medication Sig Dispense Refill    nicotine (NICODERM CQ) 21 mg/24 hr Place 1 patch onto the skin once daily.  0    thiamine 100 MG tablet Take 1 tablet (100 mg total) by mouth once daily.       No current facility-administered medications on file prior to encounter.      ALLERGIES:  No Known Allergies    Psychiatric Review Of Systems - Is patient experiencing or having changes in:  Sleep: yes, chronic poor sleep initiation   appetite: yes, chronic low appetite  weight: no  energy/anergy: no  interest/pleasure/anhedonia: no  somatic symptoms: no  libido: no  anxiety/panic: yes, mildly anxious  guilty/hopelessness: no  concentration: no  S.I.B.s/risky behavior: no  Irritability: no  Racing thoughts: no  Impulsive behaviors: no  Paranoia: no  AVH: no    Medical ROS:  See Dr. Rees's Admission Note of date 2017 for ROS.     OBJECTIVE:  Vital Signs (Most Recent)   - repeat @ 1325 HR 86  /82    Mental Status Exam:  Appearance: older than stated age, thin & gaunt looking  Behavior/Cooperation: normal, " "cooperative  Speech: normal tone, normal rate, normal pitch, normal volume  Mood: "pretty good"  Affect: normal and euthymic  Thought Process: normal and logical  Thought Content: normal, no suicidality, no homicidality, delusions, or paranoia  Orientation: grossly intact  Memory: Grossly intact  Attention Span/Concentration: Normal  Cognition: grossly intact  Insight: fair  Judgment: fair    Laboratory:  Recent Results (from the past 48 hour(s))   Toxicology screen, urine    Collection Time: 02/14/17 10:17 AM   Result Value Ref Range    Alcohol, Urine <10 <10 mg/dL    Benzodiazepines Presumptive Positive     Methadone metabolites Negative     Cocaine (Metab.) Negative     Opiate Scrn, Ur Negative     Barbiturate Screen, Ur Negative     Amphetamine Screen, Ur Negative     THC Negative     Phencyclidine Negative     Creatinine, Random Ur 316.0 23.0 - 375.0 mg/dL    Toxicology Information SEE COMMENT    POCT BREATH ALCOHOL TEST    Collection Time: 02/14/17 10:17 AM   Result Value Ref Range    Breath Alcohol 0.000        Assessment:  52 yo CM with severe alcohol use disorder, PTSD and alcohol withdrawal, recently discharged from hospital for Wernicke's encephalopathy and treated with IV thiamine with improvement in gait instability, resolution of ophthalmalplegia and improvement in cognitive function (MOCA 28/30). Pt returns for addiction treatment but drank 1/2 bottle of champagne 2/13 (night before intake). Breathalyzer negative. Hypertensive and tachycardic at admit (2/14).      Diagnosis:  Alcohol use disorder, severe  R/O Alcohol withdrawal  PTSD        Plan:  1. ABU protocol.  2. Breathalyzer and Urine toxicology daily.  3. VS daily 3x/week.  4. Labs:  - CBC (2/9) H/H 13.6/39.4; ; plt 356  - CMP (2/9) BUN 4, Mg 1.5, alb 3.4, AST 57, ALT 84  - Vitamin B12 973    - Thiamine 59  - QTc 480 ms  5. Patient counseled on abstinence from alcohol.  6. PROBATION status (for drinking 1/2 bottle of champagne " 2/13)     Medications:  - Thiamine 100 mg daily  - multivitamin       Additional notes: Pt's heart rate and blood pressure are within normal limits.  Pt reports minimal anxiety with fine bilateral hand tremors.  Bilateral nystagmus evoked on PE.  Pt reports improved unsteadiness.             Seen with Dr. Kaity Rees.       Jorge Luis Orta M.D.  02/15/2017    [facility]  PGY-2  Pager:  [phone]

## 2017-02-16 ENCOUNTER — HOSPITAL ENCOUNTER (OUTPATIENT)
Dept: PSYCHIATRY | Facility: HOSPITAL | Age: 52
Discharge: HOME | End: 2017-02-16
Attending: PSYCHIATRY & NEUROLOGY
Payer: COMMERCIAL

## 2017-02-16 VITALS — RESPIRATION RATE: 20 BRPM | HEART RATE: 116 BPM | DIASTOLIC BLOOD PRESSURE: 92 MMHG | SYSTOLIC BLOOD PRESSURE: 152 MMHG

## 2017-02-16 DIAGNOSIS — E83.42 HYPOMAGNESEMIA: ICD-10-CM

## 2017-02-16 DIAGNOSIS — R74.01 TRANSAMINITIS: ICD-10-CM

## 2017-02-16 DIAGNOSIS — Z72.0 TOBACCO USER: ICD-10-CM

## 2017-02-16 DIAGNOSIS — E51.2 WERNICKE ENCEPHALOPATHY: ICD-10-CM

## 2017-02-16 DIAGNOSIS — F10.10 ALCOHOL ABUSE: ICD-10-CM

## 2017-02-16 DIAGNOSIS — F10.20 ALCOHOL USE DISORDER, SEVERE, DEPENDENCE: Primary | ICD-10-CM

## 2017-02-16 DIAGNOSIS — D53.9 MACROCYTIC ANEMIA: ICD-10-CM

## 2017-02-16 DIAGNOSIS — R27.0 ATAXIA: ICD-10-CM

## 2017-02-16 DIAGNOSIS — H53.459 PERIPHERAL VISION LOSS, UNSPECIFIED LATERALITY: ICD-10-CM

## 2017-02-16 LAB
BREATH ALCOHOL: 0.04
BREATH ALCOHOL: 0.04

## 2017-02-16 PROCEDURE — 99238 HOSP IP/OBS DSCHRG MGMT 30/<: CPT | Mod: ,,, | Performed by: PSYCHIATRY & NEUROLOGY

## 2017-02-16 PROCEDURE — 90853 GROUP PSYCHOTHERAPY: CPT | Performed by: SOCIAL WORKER

## 2017-02-16 NOTE — DISCHARGE SUMMARY
"Eros Vital  MRN: [id number]  DOA: 2/14/2017  DOD: 2/16/2017  Attending physician: Beck Mora MD  Resident: Jorge Luis Orta MD           Discharge Summary - Psychiatry  Diagnosis:  Alcohol use disorder, severe  R/O Alcohol withdrawal  PTSD    Program course/treatments:  Patient initially started IOP in the ABU on 1/27/2017. Patient was visibly in withdrawal at intake.  Accordingly, pt was prescribed a Valium taper which he completed 2/5/2017.  Pt was also started on PO thiamine and a multivitamin.  Pt tolerated the medications well without any side effects.     On 2/7/2017, pt was observed with neuro exam findings concerning for Wernicke's (mild bilateral nystagmus with poor peripheral vision, wide-based gait and impaired memory on MOCA 26/30).  Pt was discharged from the ABU and admitted to hospital medicine for IV thiamine treatment for Wernicke's.  Pt was stabilized and discharged home 2/9.  Pt returned to the ABU on 2/14.  At intake, he reported drinking 1/2 bottle of champagne the night before with his wife to celebrate their 25th anniversary.  Pt was subsequently placed on probation status.  Breathalyzer was negative, but pt was hypertensive and tachycardic at admit.  Patient with mild tremor in left hand and wide-based gait but overall did not appear to be in withdrawal.  By 2/15, pt's vital signs had improved.      Patient was breathalyzed and gave a urine toxicology daily.  On 2/16/2017, pt's breathalyzer was positive with 0.4 reading.  Repeat breathalyzer was also positive.  Pt denied drinking alcohol, but he did admit to using mouthwash this morning.  However, studies do not support breathalyzer readings at this level for mouthwash use alone.  Pt noted with bilateral injected sclerae.  Patient did not attend AA meetings daily during his time in the program b/c he did not feel "they were particularly helpful."  He did not get a sponsor.  Pt was discharged from the program on 2/16/2017 d/t relapse and " failure to attend AA meetings as required.  Pt was recommended to seek inpatient rehabilitation for higher level of care.  Pt was given list of referral resources for inpatient rehabilitation programs.  Pt is not committable against his will at this time b/c he is not grossly intoxicated.         Labs:  - CBC (2/9) H/H 13.6/39.4; ; plt 356  - CMP (2/9) BUN 4, Mg 1.5, alb 3.4, AST 57, ALT 84  - Vitamin B12 973    - Thiamine 59  - QTc 480 ms     Vitals:  There were no vitals filed for this visit.    Discharge Exam:  Appearance: older than stated age, thin & gaunt looking, bilateral injected sclerae  Behavior/Cooperation: normal, cooperative  Speech: normal tone, normal rate, normal pitch, normal volume  Mood: euthymic  Affect: normal   Thought Process: normal and logical  Thought Content: normal, no suicidality, no homicidality, delusions, or paranoia  Orientation: grossly intact  Memory: Grossly intact  Attention Span/Concentration: Normal  Cognition: grossly intact  Insight: fair  Judgment: fair    Discharge Instructions:  Diet:  No restrictions  Activity:  activity as tolerated  Medications:  - Thiamine 100 mg daily  - multivitamin        -Take all medications as prescribed  - Follow up with outpatient mental health provider as discussed with treatment team  - Talk to your provider about any concerns or side effects with your medications  - Avoid all drugs and alcohol      Pt seen with Dr. Marie Orta MD  [facility]  PGY-2  Pager:  [phone]    02/16/2017

## 2017-02-16 NOTE — PLAN OF CARE
02/16/17 1000   Activity/Group Therapy Checklist   Group Addiction Education  (Peer presented life story; feedback given. )   Attendance Attended   Follows Direction Followed directions   Group Interactions/Observations Interacted appropriately;Supportive   Affect/Mood Range Normal range   Affect/Mood Display Appropriate   Goal Progression Progressing

## 2017-02-16 NOTE — NURSING
Pt. Blew positive on the breathalyzer x 2.  See documented results.  Reported to Dr. Rodriguez. Pt. Denies drinking alcohol to me.

## 2017-02-16 NOTE — PLAN OF CARE
02/16/17 1400   Activity/Group Therapy Checklist   Group Relapse Prevention  (Peer presented 1st step)   Attendance Attended   Follows Direction Followed directions   Group Interactions/Observations Interacted appropriately   Affect/Mood Range Normal range   Affect/Mood Display Appropriate   Goal Progression Progressing

## 2017-02-16 NOTE — NURSING
Retuned from [facility] staffing and pt. Informed staff that  Discharged him.. Refused escort, said he could walk.  Acknowledged receiving paper work for residential from Dc Naidu, .  Stated he was going to continue going to AA meetings and was planning on looking into [facility]idential [facility].   Pt. Was wished the best of luck.

## 2017-02-20 NOTE — PLAN OF CARE
02/15/17 1400   Activity/Group Therapy Checklist   Group Educational  (life balance )   Attendance Attended   Follows Direction Followed directions   Group Interactions/Observations Did not interact   Affect/Mood Range Blunted/flat

## 2020-09-14 ENCOUNTER — LAB VISIT (OUTPATIENT)
Dept: LAB | Facility: OTHER | Age: 55
End: 2020-09-14
Payer: MEDICAID

## 2020-09-14 DIAGNOSIS — Z03.818 ENCOUNTER FOR OBSERVATION FOR SUSPECTED EXPOSURE TO OTHER BIOLOGICAL AGENTS RULED OUT: ICD-10-CM

## 2020-09-14 PROCEDURE — U0003 INFECTIOUS AGENT DETECTION BY NUCLEIC ACID (DNA OR RNA); SEVERE ACUTE RESPIRATORY SYNDROME CORONAVIRUS 2 (SARS-COV-2) (CORONAVIRUS DISEASE [COVID-19]), AMPLIFIED PROBE TECHNIQUE, MAKING USE OF HIGH THROUGHPUT TECHNOLOGIES AS DESCRIBED BY CMS-2020-01-R: HCPCS

## 2020-09-15 LAB — SARS-COV-2 RNA RESP QL NAA+PROBE: NOT DETECTED

## 2020-09-21 ENCOUNTER — LAB VISIT (OUTPATIENT)
Dept: LAB | Facility: OTHER | Age: 55
End: 2020-09-21
Attending: INTERNAL MEDICINE
Payer: MEDICAID

## 2020-09-21 DIAGNOSIS — Z03.818 ENCOUNTER FOR OBSERVATION FOR SUSPECTED EXPOSURE TO OTHER BIOLOGICAL AGENTS RULED OUT: ICD-10-CM

## 2020-09-21 PROCEDURE — U0003 INFECTIOUS AGENT DETECTION BY NUCLEIC ACID (DNA OR RNA); SEVERE ACUTE RESPIRATORY SYNDROME CORONAVIRUS 2 (SARS-COV-2) (CORONAVIRUS DISEASE [COVID-19]), AMPLIFIED PROBE TECHNIQUE, MAKING USE OF HIGH THROUGHPUT TECHNOLOGIES AS DESCRIBED BY CMS-2020-01-R: HCPCS

## 2020-09-22 LAB — SARS-COV-2 RNA RESP QL NAA+PROBE: NOT DETECTED

## 2020-09-28 ENCOUNTER — LAB VISIT (OUTPATIENT)
Dept: LAB | Facility: OTHER | Age: 55
End: 2020-09-28
Attending: INTERNAL MEDICINE
Payer: MEDICAID

## 2020-09-28 DIAGNOSIS — Z03.818 ENCOUNTER FOR OBSERVATION FOR SUSPECTED EXPOSURE TO OTHER BIOLOGICAL AGENTS RULED OUT: ICD-10-CM

## 2020-09-28 PROCEDURE — U0003 INFECTIOUS AGENT DETECTION BY NUCLEIC ACID (DNA OR RNA); SEVERE ACUTE RESPIRATORY SYNDROME CORONAVIRUS 2 (SARS-COV-2) (CORONAVIRUS DISEASE [COVID-19]), AMPLIFIED PROBE TECHNIQUE, MAKING USE OF HIGH THROUGHPUT TECHNOLOGIES AS DESCRIBED BY CMS-2020-01-R: HCPCS

## 2020-09-29 LAB — SARS-COV-2 RNA RESP QL NAA+PROBE: NOT DETECTED
